# Patient Record
Sex: MALE | Employment: OTHER | URBAN - METROPOLITAN AREA
[De-identification: names, ages, dates, MRNs, and addresses within clinical notes are randomized per-mention and may not be internally consistent; named-entity substitution may affect disease eponyms.]

---

## 2024-01-29 SDOH — ECONOMIC STABILITY: HOUSING INSECURITY
IN THE LAST 12 MONTHS, WAS THERE A TIME WHEN YOU DID NOT HAVE A STEADY PLACE TO SLEEP OR SLEPT IN A SHELTER (INCLUDING NOW)?: NO

## 2024-01-29 SDOH — ECONOMIC STABILITY: FOOD INSECURITY: WITHIN THE PAST 12 MONTHS, THE FOOD YOU BOUGHT JUST DIDN'T LAST AND YOU DIDN'T HAVE MONEY TO GET MORE.: NEVER TRUE

## 2024-01-29 SDOH — ECONOMIC STABILITY: INCOME INSECURITY: IN THE LAST 12 MONTHS, WAS THERE A TIME WHEN YOU WERE NOT ABLE TO PAY THE MORTGAGE OR RENT ON TIME?: NO

## 2024-01-29 SDOH — ECONOMIC STABILITY: TRANSPORTATION INSECURITY
IN THE PAST 12 MONTHS, HAS THE LACK OF TRANSPORTATION KEPT YOU FROM MEDICAL APPOINTMENTS OR FROM GETTING MEDICATIONS?: NO

## 2024-01-29 SDOH — HEALTH STABILITY: PHYSICAL HEALTH: ON AVERAGE, HOW MANY MINUTES DO YOU ENGAGE IN EXERCISE AT THIS LEVEL?: 60 MIN

## 2024-01-29 SDOH — ECONOMIC STABILITY: FOOD INSECURITY: WITHIN THE PAST 12 MONTHS, YOU WORRIED THAT YOUR FOOD WOULD RUN OUT BEFORE YOU GOT MONEY TO BUY MORE.: NEVER TRUE

## 2024-01-29 SDOH — ECONOMIC STABILITY: TRANSPORTATION INSECURITY
IN THE PAST 12 MONTHS, HAS LACK OF TRANSPORTATION KEPT YOU FROM MEETINGS, WORK, OR FROM GETTING THINGS NEEDED FOR DAILY LIVING?: NO

## 2024-01-29 SDOH — HEALTH STABILITY: MENTAL HEALTH
STRESS IS WHEN SOMEONE FEELS TENSE, NERVOUS, ANXIOUS, OR CAN'T SLEEP AT NIGHT BECAUSE THEIR MIND IS TROUBLED. HOW STRESSED ARE YOU?: NOT AT ALL

## 2024-01-29 SDOH — HEALTH STABILITY: PHYSICAL HEALTH: ON AVERAGE, HOW MANY DAYS PER WEEK DO YOU ENGAGE IN MODERATE TO STRENUOUS EXERCISE (LIKE A BRISK WALK)?: 7 DAYS

## 2024-01-29 SDOH — ECONOMIC STABILITY: TRANSPORTATION INSECURITY
IN THE PAST 12 MONTHS, HAS LACK OF RELIABLE TRANSPORTATION KEPT YOU FROM MEDICAL APPOINTMENTS, MEETINGS, WORK OR FROM GETTING THINGS NEEDED FOR DAILY LIVING?: NO

## 2024-01-29 SDOH — ECONOMIC STABILITY: HOUSING INSECURITY

## 2024-01-29 ASSESSMENT — LIFESTYLE VARIABLES
AUDIT-C TOTAL SCORE: 0
HOW OFTEN DO YOU HAVE A DRINK CONTAINING ALCOHOL: NEVER
SKIP TO QUESTIONS 9-10: 1
HOW MANY STANDARD DRINKS CONTAINING ALCOHOL DO YOU HAVE ON A TYPICAL DAY: PATIENT DOES NOT DRINK
HOW OFTEN DO YOU HAVE SIX OR MORE DRINKS ON ONE OCCASION: NEVER

## 2024-01-29 ASSESSMENT — SOCIAL DETERMINANTS OF HEALTH (SDOH)
HOW OFTEN DO YOU ATTEND CHURCH OR RELIGIOUS SERVICES?: NEVER
HOW OFTEN DO YOU HAVE A DRINK CONTAINING ALCOHOL: NEVER
HOW OFTEN DO YOU HAVE SIX OR MORE DRINKS ON ONE OCCASION: NEVER
HOW OFTEN DO YOU ATTENT MEETINGS OF THE CLUB OR ORGANIZATION YOU BELONG TO?: NEVER
IN A TYPICAL WEEK, HOW MANY TIMES DO YOU TALK ON THE PHONE WITH FAMILY, FRIENDS, OR NEIGHBORS?: MORE THAN THREE TIMES A WEEK
HOW OFTEN DO YOU GET TOGETHER WITH FRIENDS OR RELATIVES?: PATIENT DECLINED
DO YOU BELONG TO ANY CLUBS OR ORGANIZATIONS SUCH AS CHURCH GROUPS UNIONS, FRATERNAL OR ATHLETIC GROUPS, OR SCHOOL GROUPS?: NO
HOW OFTEN DO YOU GET TOGETHER WITH FRIENDS OR RELATIVES?: PATIENT DECLINED
WITHIN THE PAST 12 MONTHS, YOU WORRIED THAT YOUR FOOD WOULD RUN OUT BEFORE YOU GOT THE MONEY TO BUY MORE: NEVER TRUE
DO YOU BELONG TO ANY CLUBS OR ORGANIZATIONS SUCH AS CHURCH GROUPS UNIONS, FRATERNAL OR ATHLETIC GROUPS, OR SCHOOL GROUPS?: NO
IN A TYPICAL WEEK, HOW MANY TIMES DO YOU TALK ON THE PHONE WITH FAMILY, FRIENDS, OR NEIGHBORS?: MORE THAN THREE TIMES A WEEK
HOW MANY DRINKS CONTAINING ALCOHOL DO YOU HAVE ON A TYPICAL DAY WHEN YOU ARE DRINKING: PATIENT DOES NOT DRINK
HOW OFTEN DO YOU ATTEND CHURCH OR RELIGIOUS SERVICES?: NEVER
HOW OFTEN DO YOU ATTENT MEETINGS OF THE CLUB OR ORGANIZATION YOU BELONG TO?: NEVER

## 2024-02-01 ENCOUNTER — OFFICE VISIT (OUTPATIENT)
Dept: MEDICAL GROUP | Facility: LAB | Age: 79
End: 2024-02-01
Payer: MEDICARE

## 2024-02-01 VITALS
HEIGHT: 70 IN | HEART RATE: 92 BPM | BODY MASS INDEX: 25.05 KG/M2 | TEMPERATURE: 98 F | WEIGHT: 175 LBS | OXYGEN SATURATION: 96 % | RESPIRATION RATE: 12 BRPM | SYSTOLIC BLOOD PRESSURE: 122 MMHG | DIASTOLIC BLOOD PRESSURE: 64 MMHG

## 2024-02-01 DIAGNOSIS — I15.2 HYPERTENSION ASSOCIATED WITH TYPE 2 DIABETES MELLITUS (HCC): ICD-10-CM

## 2024-02-01 DIAGNOSIS — E11.59 HYPERTENSION ASSOCIATED WITH TYPE 2 DIABETES MELLITUS (HCC): ICD-10-CM

## 2024-02-01 DIAGNOSIS — N18.31 STAGE 3A CHRONIC KIDNEY DISEASE: ICD-10-CM

## 2024-02-01 DIAGNOSIS — G62.9 NEUROPATHY: ICD-10-CM

## 2024-02-01 DIAGNOSIS — R17 ELEVATED BILIRUBIN: ICD-10-CM

## 2024-02-01 DIAGNOSIS — E11.69 TYPE 2 DIABETES MELLITUS WITH OTHER SPECIFIED COMPLICATION, WITHOUT LONG-TERM CURRENT USE OF INSULIN (HCC): Chronic | ICD-10-CM

## 2024-02-01 PROBLEM — I10 BENIGN ESSENTIAL HTN: Chronic | Status: ACTIVE | Noted: 2021-12-16

## 2024-02-01 PROBLEM — I77.810 ASCENDING AORTA DILATATION (HCC): Status: ACTIVE | Noted: 2023-02-20

## 2024-02-01 PROBLEM — R13.10 DYSPHAGIA: Status: ACTIVE | Noted: 2021-12-16

## 2024-02-01 PROBLEM — N18.2 CHRONIC KIDNEY DISEASE, STAGE 2, MILDLY DECREASED GFR: Status: ACTIVE | Noted: 2023-09-13

## 2024-02-01 PROBLEM — E11.40 DIABETIC NEUROPATHY (HCC): Chronic | Status: ACTIVE | Noted: 2021-12-16

## 2024-02-01 PROBLEM — R39.12 WEAK URINARY STREAM: Status: ACTIVE | Noted: 2023-06-15

## 2024-02-01 PROBLEM — I35.8 MILD AORTIC VALVE SCLEROSIS: Status: ACTIVE | Noted: 2023-02-20

## 2024-02-01 PROBLEM — K75.81 NASH (NONALCOHOLIC STEATOHEPATITIS): Chronic | Status: ACTIVE | Noted: 2021-12-16

## 2024-02-01 PROBLEM — E03.9 ACQUIRED HYPOTHYROIDISM: Chronic | Status: ACTIVE | Noted: 2021-12-16

## 2024-02-01 PROBLEM — M25.512 LEFT SHOULDER PAIN: Status: ACTIVE | Noted: 2023-07-11

## 2024-02-01 LAB
HBA1C MFR BLD: 5.7 % (ref ?–5.8)
POCT INT CON NEG: NEGATIVE
POCT INT CON POS: POSITIVE

## 2024-02-01 PROCEDURE — 3078F DIAST BP <80 MM HG: CPT | Performed by: STUDENT IN AN ORGANIZED HEALTH CARE EDUCATION/TRAINING PROGRAM

## 2024-02-01 PROCEDURE — 3074F SYST BP LT 130 MM HG: CPT | Performed by: STUDENT IN AN ORGANIZED HEALTH CARE EDUCATION/TRAINING PROGRAM

## 2024-02-01 PROCEDURE — 83036 HEMOGLOBIN GLYCOSYLATED A1C: CPT | Performed by: STUDENT IN AN ORGANIZED HEALTH CARE EDUCATION/TRAINING PROGRAM

## 2024-02-01 PROCEDURE — 99204 OFFICE O/P NEW MOD 45 MIN: CPT | Performed by: STUDENT IN AN ORGANIZED HEALTH CARE EDUCATION/TRAINING PROGRAM

## 2024-02-01 RX ORDER — SITAGLIPTIN AND METFORMIN HYDROCHLORIDE 1000; 50 MG/1; MG/1
1 TABLET, FILM COATED, EXTENDED RELEASE ORAL 2 TIMES DAILY
COMMUNITY
Start: 2024-01-10

## 2024-02-01 RX ORDER — LISINOPRIL AND HYDROCHLOROTHIAZIDE 20; 12.5 MG/1; MG/1
1 TABLET ORAL DAILY
COMMUNITY
End: 2024-02-01

## 2024-02-01 RX ORDER — LEVOTHYROXINE SODIUM 112 UG/1
112 TABLET ORAL
COMMUNITY
End: 2024-03-20 | Stop reason: SDUPTHER

## 2024-02-01 RX ORDER — LISINOPRIL 20 MG/1
20 TABLET ORAL DAILY
COMMUNITY

## 2024-02-01 RX ORDER — GABAPENTIN 600 MG/1
600 TABLET ORAL NIGHTLY
COMMUNITY
Start: 2023-10-30 | End: 2024-02-01

## 2024-02-01 RX ORDER — FINASTERIDE 5 MG/1
1 TABLET, FILM COATED ORAL DAILY
COMMUNITY
Start: 2023-08-30 | End: 2024-02-01

## 2024-02-01 RX ORDER — SITAGLIPTIN AND METFORMIN HYDROCHLORIDE 1000; 50 MG/1; MG/1
1 TABLET, FILM COATED ORAL 2 TIMES DAILY WITH MEALS
COMMUNITY
End: 2024-02-01

## 2024-02-01 ASSESSMENT — FIBROSIS 4 INDEX: FIB4 SCORE: 1.44

## 2024-02-01 ASSESSMENT — PATIENT HEALTH QUESTIONNAIRE - PHQ9: CLINICAL INTERPRETATION OF PHQ2 SCORE: 0

## 2024-02-01 NOTE — PATIENT INSTRUCTIONS
Ideal blood pressure <130/80 and at least <140/90.  If <100 for the top number we are over treating.    Ideal glucose ranges: fasting , random (1-2 hours after eating) <180. If glucose <70, we need to adjust your medication to prevent continuing low blood sugar.    Aim for goal of 150 minutes a week of moderate intensity exercise (ex 30 minutes 5 times a week)    Topicals for neuropathy:  Capsicin (careful, don't touch eyes), lidocaine topical    Oral options:  Cymbalta, Lyrica

## 2024-02-01 NOTE — PROGRESS NOTES
Subjective:     Chief Complaint   Patient presents with    Establish Care     HISTORY OF THE PRESENT ILLNESS: Patient is a 78 y.o. male. This pleasant patient is here today to establish care and discuss neuropathy. His/her prior PCP was Dr. Betsy Swan.    We reviewed patient's history.  States that in the past 18 months he has had worsening neuropathy symptoms in his bilateral feet.  In the past he was trialed and titrated up on gabapentin max 600 mg at night but found it not helpful and sedating.  Denies any back pain or radiating symptoms.  Symptoms are typically worse at night with numbness and tingling causing an uncomfortable sensation in his feet.  States that at times he feels like his balance is off but has not had any falls.  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  States that his heels have cracked at times and he does use lotion for this with improvement.    He does have a history of diabetes which she reports was better controlled when living in [place] and more active.  As he recalls his maximum A1c was 6.6%.    He previously was on finasteride 5 mg daily but found it ineffective as well after taking it for some time so has discontinued for a few months.  For his diabetes he has been consistent with Janumet XR  mg 1 tablet twice daily without side effects.    Review of Systems   Constitutional:  Negative for chills, fever, malaise/fatigue and weight loss.   Respiratory:  Negative for cough and shortness of breath.    Cardiovascular:  Negative for chest pain, palpitations, claudication and leg swelling.   Gastrointestinal:  Negative for abdominal pain, diarrhea, nausea and vomiting.   Musculoskeletal:  Negative for back pain and falls.   Skin:  Negative for rash.   Neurological:  Positive for tingling and sensory change.         Objective:     Exam: /64 (BP Location: Left arm, Patient Position: Sitting, BP Cuff Size: Adult)   Pulse 92   Temp 36.7  "°C (98 °F)   Resp 12   Ht 1.778 m (5' 10\")   Wt 79.4 kg (175 lb)   SpO2 96%  Body mass index is 25.11 kg/m².    Physical Exam  Vitals reviewed.   Constitutional:       General: He is not in acute distress.     Appearance: Normal appearance. He is not ill-appearing.   HENT:      Head: Normocephalic and atraumatic.      Nose: Nose normal.      Mouth/Throat:      Mouth: Mucous membranes are moist.   Cardiovascular:      Rate and Rhythm: Normal rate and regular rhythm.      Heart sounds: Normal heart sounds. No murmur heard.  Pulmonary:      Effort: Pulmonary effort is normal. No respiratory distress.      Breath sounds: Normal breath sounds. No wheezing, rhonchi or rales.   Musculoskeletal:      Cervical back: Normal range of motion and neck supple.      Right lower leg: No edema.      Left lower leg: No edema.   Feet:      Right foot:      Skin integrity: Dry skin present. No warmth or callus.      Left foot:      Skin integrity: Dry skin present. No erythema, warmth or callus.      Comments: Monofilament testing with a 10 gram force: sensation intact: intact bilaterally, minimal decreased to monofilament in heels bilaterally.   Visual Inspection: Feet without maceration, ulcers, fissures. Dry heels.  Pedal pulses: intact bilaterally    Skin:     General: Skin is warm and dry.   Neurological:      General: No focal deficit present.      Mental Status: He is alert and oriented to person, place, and time.      Sensory: No sensory deficit (Intact to light tough and vibration in LE.).      Gait: Gait normal.      Deep Tendon Reflexes: Babinski sign absent on the right side. Babinski sign absent on the left side.      Reflex Scores:       Patellar reflexes are 2+ on the right side and 2+ on the left side.       Achilles reflexes are 2+ on the right side and 2+ on the left side.  Psychiatric:         Mood and Affect: Mood normal.         Behavior: Behavior normal.         Thought Content: Thought content normal. "       Labs: Reviewed from 9/2022, 6/9/2023, 9/8/2023  Imaging: Reviewed from 2/10/2023    Assessment & Plan:   78 y.o. male with the following -    1. Neuropathy  This is a chronic condition, worsening in the past 18 months.  Suspected to be related to diabetes however patient has never particularly had uncontrolled diabetes with highest A1c 6.6%.  Evaluate further with labs in nerve conduction testing as below.  Patient declines trial of alternative medications for symptoms at this time we discussed some topical medication such as lidocaine or capsaicin which may be helpful.  - VITAMIN B12; Future  - TSH WITH REFLEX TO FT4; Future  - ANTI-NUCLEAR ANTIBODY SERUM; Future  - VITAMIN B1; Future  - SERUM PROTEIN ELECTROPHORESIS; Future  - IMMUNOFIXATION ELECTROPHORESIS (TORRES), SERUM; Future  - Sed Rate; Future  - PROTEIN ELECTROPHORESIS, UR; Future  - Referral to Neurodiagnostics (EEG,EP,EMG/NCS/DBS)    2. Type 2 diabetes mellitus with other specified complication, without long-term current use of insulin (HCC)  Chronic, controlled. Continue medication(s) as below.  Encouraged diabetic diet and exercise as tolerated.  Plan for retinopathy exam and follow-up.  - A1C in 6 months.  - POCT  A1C    3. Stage 3a chronic kidney disease (HCC)  Chronic condition, stable. BP well controlled, continue ACEI. Avoid nephrotoxic medications and renally dose metformin as indicated. Trend Cr.  - Comp Metabolic Panel; Future  - CBC WITH DIFFERENTIAL; Future  - MICROALBUMIN CREAT RATIO URINE; Future  - PTH INTACT (PTH ONLY); Future  - PHOSPHORUS; Future    4. Hypertension associated with type 2 diabetes mellitus (HCC)  Chronic, controlled. Continue below medication(s). Discussed ideal ranges/return precautions.    lisinopril (PRINIVIL) 20 MG Tab, Take 20 mg by mouth every day., Disp: , Rfl:     5. Elevated bilirubin  Chronic, mild elevation.  He does have a history of fatty liver disease.  Trend.  - Comp Metabolic Panel; Future  - BILIRUBIN  DIRECT; Future    Return in 2 months (on 4/1/2024), or if symptoms worsen or fail to improve.    Please note that this dictation was created using voice recognition software. I have made every reasonable attempt to correct obvious errors, but I expect that there are errors of grammar and possibly content that I did not discover before finalizing the note.

## 2024-02-02 ASSESSMENT — ENCOUNTER SYMPTOMS
SHORTNESS OF BREATH: 0
DIARRHEA: 0
SENSORY CHANGE: 1
NAUSEA: 0
COUGH: 0
FEVER: 0
WEIGHT LOSS: 0
ABDOMINAL PAIN: 0
PALPITATIONS: 0
FALLS: 0
BACK PAIN: 0
CLAUDICATION: 0
VOMITING: 0
TINGLING: 1
CHILLS: 0

## 2024-02-14 ENCOUNTER — HOSPITAL ENCOUNTER (OUTPATIENT)
Dept: LAB | Facility: MEDICAL CENTER | Age: 79
End: 2024-02-14
Attending: STUDENT IN AN ORGANIZED HEALTH CARE EDUCATION/TRAINING PROGRAM
Payer: MEDICARE

## 2024-02-14 DIAGNOSIS — R17 ELEVATED BILIRUBIN: ICD-10-CM

## 2024-02-14 DIAGNOSIS — G62.9 NEUROPATHY: ICD-10-CM

## 2024-02-14 DIAGNOSIS — N18.31 STAGE 3A CHRONIC KIDNEY DISEASE: ICD-10-CM

## 2024-02-14 LAB
BASOPHILS # BLD AUTO: 0.5 % (ref 0–1.8)
BASOPHILS # BLD: 0.02 K/UL (ref 0–0.12)
EOSINOPHIL # BLD AUTO: 0.04 K/UL (ref 0–0.51)
EOSINOPHIL NFR BLD: 1 % (ref 0–6.9)
ERYTHROCYTE [DISTWIDTH] IN BLOOD BY AUTOMATED COUNT: 42 FL (ref 35.9–50)
HCT VFR BLD AUTO: 43.1 % (ref 42–52)
HGB BLD-MCNC: 14.4 G/DL (ref 14–18)
IMM GRANULOCYTES # BLD AUTO: 0.01 K/UL (ref 0–0.11)
IMM GRANULOCYTES NFR BLD AUTO: 0.2 % (ref 0–0.9)
LYMPHOCYTES # BLD AUTO: 1.39 K/UL (ref 1–4.8)
LYMPHOCYTES NFR BLD: 34.4 % (ref 22–41)
MCH RBC QN AUTO: 29.8 PG (ref 27–33)
MCHC RBC AUTO-ENTMCNC: 33.4 G/DL (ref 32.3–36.5)
MCV RBC AUTO: 89 FL (ref 81.4–97.8)
MONOCYTES # BLD AUTO: 0.33 K/UL (ref 0–0.85)
MONOCYTES NFR BLD AUTO: 8.2 % (ref 0–13.4)
NEUTROPHILS # BLD AUTO: 2.25 K/UL (ref 1.82–7.42)
NEUTROPHILS NFR BLD: 55.7 % (ref 44–72)
NRBC # BLD AUTO: 0 K/UL
NRBC BLD-RTO: 0 /100 WBC (ref 0–0.2)
PLATELET # BLD AUTO: 127 K/UL (ref 164–446)
PMV BLD AUTO: 10.5 FL (ref 9–12.9)
PTH-INTACT SERPL-MCNC: 30.1 PG/ML (ref 14–72)
RBC # BLD AUTO: 4.84 M/UL (ref 4.7–6.1)
VIT B12 SERPL-MCNC: 1056 PG/ML (ref 211–911)
WBC # BLD AUTO: 4 K/UL (ref 4.8–10.8)

## 2024-02-14 PROCEDURE — 85025 COMPLETE CBC W/AUTO DIFF WBC: CPT

## 2024-02-14 PROCEDURE — 84155 ASSAY OF PROTEIN SERUM: CPT | Mod: XU

## 2024-02-14 PROCEDURE — 82570 ASSAY OF URINE CREATININE: CPT

## 2024-02-14 PROCEDURE — 82248 BILIRUBIN DIRECT: CPT

## 2024-02-14 PROCEDURE — 80053 COMPREHEN METABOLIC PANEL: CPT

## 2024-02-14 PROCEDURE — 84425 ASSAY OF VITAMIN B-1: CPT

## 2024-02-14 PROCEDURE — 82607 VITAMIN B-12: CPT

## 2024-02-14 PROCEDURE — 36415 COLL VENOUS BLD VENIPUNCTURE: CPT

## 2024-02-14 PROCEDURE — 86038 ANTINUCLEAR ANTIBODIES: CPT

## 2024-02-14 PROCEDURE — 85652 RBC SED RATE AUTOMATED: CPT

## 2024-02-14 PROCEDURE — 84165 PROTEIN E-PHORESIS SERUM: CPT

## 2024-02-14 PROCEDURE — 86334 IMMUNOFIX E-PHORESIS SERUM: CPT

## 2024-02-14 PROCEDURE — 83970 ASSAY OF PARATHORMONE: CPT

## 2024-02-14 PROCEDURE — 84100 ASSAY OF PHOSPHORUS: CPT

## 2024-02-14 PROCEDURE — 82043 UR ALBUMIN QUANTITATIVE: CPT

## 2024-02-15 LAB
ALBUMIN SERPL BCP-MCNC: 4.5 G/DL (ref 3.2–4.9)
ALBUMIN/GLOB SERPL: 1.3 G/DL
ALP SERPL-CCNC: 44 U/L (ref 30–99)
ALT SERPL-CCNC: 27 U/L (ref 2–50)
ANION GAP SERPL CALC-SCNC: 13 MMOL/L (ref 7–16)
AST SERPL-CCNC: 23 U/L (ref 12–45)
BILIRUB CONJ SERPL-MCNC: 0.2 MG/DL (ref 0.1–0.5)
BILIRUB INDIRECT SERPL-MCNC: 0.9 MG/DL (ref 0–1)
BILIRUB SERPL-MCNC: 1.1 MG/DL (ref 0.1–1.5)
BUN SERPL-MCNC: 16 MG/DL (ref 8–22)
CALCIUM ALBUM COR SERPL-MCNC: 8.9 MG/DL (ref 8.5–10.5)
CALCIUM SERPL-MCNC: 9.3 MG/DL (ref 8.5–10.5)
CHLORIDE SERPL-SCNC: 100 MMOL/L (ref 96–112)
CO2 SERPL-SCNC: 23 MMOL/L (ref 20–33)
CREAT SERPL-MCNC: 0.97 MG/DL (ref 0.5–1.4)
CREAT UR-MCNC: 229.34 MG/DL
ERYTHROCYTE [SEDIMENTATION RATE] IN BLOOD BY WESTERGREN METHOD: 7 MM/HOUR (ref 0–20)
GFR SERPLBLD CREATININE-BSD FMLA CKD-EPI: 81 ML/MIN/1.73 M 2
GLOBULIN SER CALC-MCNC: 3.4 G/DL (ref 1.9–3.5)
GLUCOSE SERPL-MCNC: 121 MG/DL (ref 65–99)
MICROALBUMIN UR-MCNC: <1.2 MG/DL
MICROALBUMIN/CREAT UR: NORMAL MG/G (ref 0–30)
PHOSPHATE SERPL-MCNC: 1.8 MG/DL (ref 2.5–4.5)
POTASSIUM SERPL-SCNC: 4 MMOL/L (ref 3.6–5.5)
PROT SERPL-MCNC: 7.9 G/DL (ref 6–8.2)
SODIUM SERPL-SCNC: 136 MMOL/L (ref 135–145)

## 2024-02-16 LAB — NUCLEAR IGG SER QL IA: NORMAL

## 2024-02-17 LAB
ALBUMIN SERPL ELPH-MCNC: 4.24 G/DL (ref 3.75–5.01)
ALPHA1 GLOB SERPL ELPH-MCNC: 0.25 G/DL (ref 0.19–0.46)
ALPHA2 GLOB SERPL ELPH-MCNC: 0.77 G/DL (ref 0.48–1.05)
B-GLOBULIN SERPL ELPH-MCNC: 0.9 G/DL (ref 0.48–1.1)
EER PROT ELECT SER Q1092: NORMAL
GAMMA GLOB SERPL ELPH-MCNC: 1.35 G/DL (ref 0.62–1.51)
INTERPRETATION SERPL IFE-IMP: NORMAL
INTERPRETATION SERPL IFE-IMP: NORMAL
MONOCLONAL PROTEIN NL11656: NORMAL G/DL
PATHOLOGY STUDY: NORMAL
PROT SERPL-MCNC: 7.5 G/DL (ref 6.3–8.2)

## 2024-02-18 LAB — VIT B1 BLD-MCNC: 84 NMOL/L (ref 70–180)

## 2024-03-04 DIAGNOSIS — E03.9 ACQUIRED HYPOTHYROIDISM: Chronic | ICD-10-CM

## 2024-03-04 RX ORDER — LEVOTHYROXINE SODIUM 112 UG/1
112 TABLET ORAL
Qty: 90 TABLET | Refills: 3 | Status: CANCELLED | OUTPATIENT
Start: 2024-03-04

## 2024-03-20 ENCOUNTER — HOSPITAL ENCOUNTER (OUTPATIENT)
Dept: LAB | Facility: MEDICAL CENTER | Age: 79
End: 2024-03-20
Attending: STUDENT IN AN ORGANIZED HEALTH CARE EDUCATION/TRAINING PROGRAM
Payer: MEDICARE

## 2024-03-20 DIAGNOSIS — E03.9 ACQUIRED HYPOTHYROIDISM: Chronic | ICD-10-CM

## 2024-03-20 LAB
T4 FREE SERPL-MCNC: 1.5 NG/DL (ref 0.93–1.7)
TSH SERPL DL<=0.005 MIU/L-ACNC: 0.17 UIU/ML (ref 0.38–5.33)

## 2024-03-20 PROCEDURE — 36415 COLL VENOUS BLD VENIPUNCTURE: CPT

## 2024-03-20 PROCEDURE — 84443 ASSAY THYROID STIM HORMONE: CPT

## 2024-03-20 PROCEDURE — 84439 ASSAY OF FREE THYROXINE: CPT

## 2024-03-20 RX ORDER — LEVOTHYROXINE SODIUM 112 UG/1
112 TABLET ORAL
Qty: 90 TABLET | Refills: 0 | Status: SHIPPED | OUTPATIENT
Start: 2024-03-20

## 2024-04-04 ENCOUNTER — OFFICE VISIT (OUTPATIENT)
Dept: MEDICAL GROUP | Facility: LAB | Age: 79
End: 2024-04-04
Payer: MEDICARE

## 2024-04-04 ENCOUNTER — HOSPITAL ENCOUNTER (OUTPATIENT)
Dept: LAB | Facility: MEDICAL CENTER | Age: 79
End: 2024-04-04
Attending: STUDENT IN AN ORGANIZED HEALTH CARE EDUCATION/TRAINING PROGRAM
Payer: MEDICARE

## 2024-04-04 VITALS
DIASTOLIC BLOOD PRESSURE: 58 MMHG | RESPIRATION RATE: 16 BRPM | WEIGHT: 179 LBS | HEART RATE: 90 BPM | SYSTOLIC BLOOD PRESSURE: 116 MMHG | OXYGEN SATURATION: 95 % | HEIGHT: 70 IN | TEMPERATURE: 97.1 F | BODY MASS INDEX: 25.62 KG/M2

## 2024-04-04 DIAGNOSIS — E03.9 ACQUIRED HYPOTHYROIDISM: Chronic | ICD-10-CM

## 2024-04-04 DIAGNOSIS — E83.39 HYPOPHOSPHATEMIA: ICD-10-CM

## 2024-04-04 DIAGNOSIS — E11.69 TYPE 2 DIABETES MELLITUS WITH OTHER SPECIFIED COMPLICATION, WITHOUT LONG-TERM CURRENT USE OF INSULIN (HCC): Chronic | ICD-10-CM

## 2024-04-04 DIAGNOSIS — E11.42 DIABETIC POLYNEUROPATHY ASSOCIATED WITH TYPE 2 DIABETES MELLITUS (HCC): Chronic | ICD-10-CM

## 2024-04-04 DIAGNOSIS — D69.6 THROMBOCYTOPENIA (HCC): ICD-10-CM

## 2024-04-04 DIAGNOSIS — D72.819 LEUKOPENIA, UNSPECIFIED TYPE: ICD-10-CM

## 2024-04-04 DIAGNOSIS — N18.2 STAGE 2 CHRONIC KIDNEY DISEASE: ICD-10-CM

## 2024-04-04 PROBLEM — N18.31 STAGE 3A CHRONIC KIDNEY DISEASE: Status: RESOLVED | Noted: 2023-09-13 | Resolved: 2024-04-04

## 2024-04-04 LAB
BASOPHILS # BLD AUTO: 0.7 % (ref 0–1.8)
BASOPHILS # BLD: 0.03 K/UL (ref 0–0.12)
EOSINOPHIL # BLD AUTO: 0.12 K/UL (ref 0–0.51)
EOSINOPHIL NFR BLD: 3 % (ref 0–6.9)
ERYTHROCYTE [DISTWIDTH] IN BLOOD BY AUTOMATED COUNT: 42.8 FL (ref 35.9–50)
HCT VFR BLD AUTO: 46.6 % (ref 42–52)
HGB BLD-MCNC: 15.8 G/DL (ref 14–18)
IMM GRANULOCYTES # BLD AUTO: 0.01 K/UL (ref 0–0.11)
IMM GRANULOCYTES NFR BLD AUTO: 0.2 % (ref 0–0.9)
LYMPHOCYTES # BLD AUTO: 1.48 K/UL (ref 1–4.8)
LYMPHOCYTES NFR BLD: 36.6 % (ref 22–41)
MCH RBC QN AUTO: 30.2 PG (ref 27–33)
MCHC RBC AUTO-ENTMCNC: 33.9 G/DL (ref 32.3–36.5)
MCV RBC AUTO: 88.9 FL (ref 81.4–97.8)
MONOCYTES # BLD AUTO: 0.41 K/UL (ref 0–0.85)
MONOCYTES NFR BLD AUTO: 10.1 % (ref 0–13.4)
NEUTROPHILS # BLD AUTO: 1.99 K/UL (ref 1.82–7.42)
NEUTROPHILS NFR BLD: 49.4 % (ref 44–72)
NRBC # BLD AUTO: 0 K/UL
NRBC BLD-RTO: 0 /100 WBC (ref 0–0.2)
PLATELET # BLD AUTO: 155 K/UL (ref 164–446)
PLATELET # BLD AUTO: 191 K/UL (ref 164–446)
PLATELETS.RETICULATED NFR BLD AUTO: 10.3 % (ref 0.6–13.1)
PMV BLD AUTO: 10.4 FL (ref 9–12.9)
RBC # BLD AUTO: 5.24 M/UL (ref 4.7–6.1)
WBC # BLD AUTO: 4 K/UL (ref 4.8–10.8)

## 2024-04-04 PROCEDURE — 36415 COLL VENOUS BLD VENIPUNCTURE: CPT

## 2024-04-04 PROCEDURE — 3074F SYST BP LT 130 MM HG: CPT | Performed by: STUDENT IN AN ORGANIZED HEALTH CARE EDUCATION/TRAINING PROGRAM

## 2024-04-04 PROCEDURE — 84100 ASSAY OF PHOSPHORUS: CPT

## 2024-04-04 PROCEDURE — 82746 ASSAY OF FOLIC ACID SERUM: CPT

## 2024-04-04 PROCEDURE — 84443 ASSAY THYROID STIM HORMONE: CPT

## 2024-04-04 PROCEDURE — 99214 OFFICE O/P EST MOD 30 MIN: CPT | Performed by: STUDENT IN AN ORGANIZED HEALTH CARE EDUCATION/TRAINING PROGRAM

## 2024-04-04 PROCEDURE — 85055 RETICULATED PLATELET ASSAY: CPT

## 2024-04-04 PROCEDURE — 3078F DIAST BP <80 MM HG: CPT | Performed by: STUDENT IN AN ORGANIZED HEALTH CARE EDUCATION/TRAINING PROGRAM

## 2024-04-04 PROCEDURE — 85025 COMPLETE CBC W/AUTO DIFF WBC: CPT

## 2024-04-04 PROCEDURE — 84439 ASSAY OF FREE THYROXINE: CPT

## 2024-04-04 ASSESSMENT — ENCOUNTER SYMPTOMS
VOMITING: 0
COUGH: 0
BRUISES/BLEEDS EASILY: 0
DIARRHEA: 0
FEVER: 0
NAUSEA: 0
BLOOD IN STOOL: 0
SHORTNESS OF BREATH: 0
ABDOMINAL PAIN: 0
WEIGHT LOSS: 0
CHILLS: 0

## 2024-04-04 ASSESSMENT — FIBROSIS 4 INDEX: FIB4 SCORE: 2.65

## 2024-04-04 NOTE — PATIENT INSTRUCTIONS
non-fasting labs due ASAP    If thyroid off still, we will reduce the dose and repeat labs in 4-6 weeks.     Ideal glucose ranges: fasting , random (1-2 hours after eating) <180. If glucose <70, we need to adjust your medication to prevent continuing low blood sugar.    Ideal blood pressure <130/80 and at least <140/90.  If <100 for the top number we are over treating.  Severe range >180/120, with symptoms=ER

## 2024-04-04 NOTE — PROGRESS NOTES
"Subjective:     Chief Complaint   Patient presents with    Follow-Up     HPI:   Shay presents today for follow-up and lab review.    See last visit for details.  Patient states that neuropathy symptoms have improved and he finds over-the-counter CBD orally to be helpful.    Review of Systems   Constitutional:  Negative for chills, fever, malaise/fatigue and weight loss.   HENT:  Negative for nosebleeds.    Respiratory:  Negative for cough and shortness of breath.    Cardiovascular:  Negative for chest pain and palpitations.   Gastrointestinal:  Negative for abdominal pain, blood in stool, diarrhea, nausea and vomiting.   Genitourinary:  Negative for hematuria.   Skin:  Negative for rash.   Neurological:  Positive for tingling and sensory change.   Endo/Heme/Allergies:  Does not bruise/bleed easily.     Objective:     Exam:  /58 (BP Location: Left arm, Patient Position: Sitting, BP Cuff Size: Adult)   Pulse 90   Temp 36.2 °C (97.1 °F) (Temporal)   Resp 16   Ht 1.778 m (5' 10\")   Wt 81.2 kg (179 lb)   SpO2 95%   BMI 25.68 kg/m²  Body mass index is 25.68 kg/m².    Physical Exam  Vitals reviewed.   Constitutional:       General: He is not in acute distress.     Appearance: Normal appearance. He is not ill-appearing.   HENT:      Head: Normocephalic and atraumatic.      Nose: Nose normal.      Mouth/Throat:      Mouth: Mucous membranes are moist.   Cardiovascular:      Rate and Rhythm: Normal rate and regular rhythm.      Heart sounds: Normal heart sounds. No murmur heard.  Pulmonary:      Effort: Pulmonary effort is normal. No respiratory distress.      Breath sounds: Normal breath sounds. No wheezing, rhonchi or rales.   Musculoskeletal:      Cervical back: Normal range of motion and neck supple.      Right lower leg: No edema.      Left lower leg: No edema.   Skin:     General: Skin is warm and dry.   Neurological:      General: No focal deficit present.      Mental Status: He is alert and oriented to " person, place, and time.   Psychiatric:         Mood and Affect: Mood normal.         Behavior: Behavior normal.         Thought Content: Thought content normal.     Treatment  Labs: Reviewed from 2/14/24    Assessment & Plan:     78 y.o. male with the following -     1. Diabetic polyneuropathy associated with type 2 diabetes mellitus (HCC)  Chronic, controlled with OTC CBD. Labs reviewed with patient. Trend A1C in 6m.     2. Type 2 diabetes mellitus with other specified complication, without long-term current use of insulin (HCC)  Chronic, controlled. Continue medication(s) as below.   - A1C in 6 months.   - Annual fasting labs ordered to complete in 9/2024  - Comp Metabolic Panel; Future  - Lipid Profile; Future  - HEMOGLOBIN A1C; Future    JANUMET XR  MG TABLET SR 24 HR, Take 1 Tablet by mouth 2 times a day., Disp: , Rfl:     3. Stage 2 chronic kidney disease  4. Hypophosphatemia  Chronic kidney disease, improved. Phosphate low, PTH normal. DM as above, BP well controlled, continue ACEI. Avoid nephrotoxic medications. Trend Cr in 6m, repeat phosphorus non fasting today.  - PHOSPHORUS; Future    5. Acquired hypothyroidism  Chronic, TSH suppressed. Repeat to confirm today, dicussed plan if still over treated.   - TSH WITH REFLEX TO FT4; Future    6. Thrombocytopenia (HCC)  7. Leukopenia, unspecified type  New, mild. Trend and check folate for completeness. ADRI and B12 normal. Obtain on citrate tube to exclude platelet clumping.   - CBC WITH DIFFERENTIAL; Future  - FOLATE; Future  ___________________  Labs obtained after visit reviewed.    4. Hypophosphatemia  Improved on repeat but persists with non fasting labs. PTH normal, see thyroid below. Trend with next labs, check vitamin D.   - PHOSPHORUS; Future  - PHOSPHORUS; Future  - VITAMIN D,25 HYDROXY (DEFICIENCY); Future  - Basic Metabolic Panel; Future    5. Acquired hypothyroidism  Chronic, TSH still suppressed. Reduce levothyroxine as previously discussed  in clinic to 100 mcg daily and trend labs in 4 to 6 weeks.  - TSH WITH REFLEX TO FT4; Future  - levothyroxine (SYNTHROID) 100 MCG Tab; Take 1 Tablet by mouth every morning on an empty stomach. Repeat labs in 4-6 weeks  Dispense: 90 Tablet; Refill: 0  - TSH; Future  - FREE THYROXINE; Future  - TRIIDOTHYRONINE; Future    7. Leukopenia, unspecified type  Chronic, stable. B12/folate/ADRI normal. Hep C negative.2019, no risk factors for HIV and patient is prior  so has been checked prior. Trend CBC with labs in 6m.   - CBC WITH DIFFERENTIAL; Future  - FOLATE; Future  - CBC WITH DIFFERENTIAL; Future    Return in about 6 months (around 10/4/2024), or if symptoms worsen or fail to improve, for Diabetes, Annual Medicare Visit.    Please note that this dictation was created using voice recognition software. I have made every reasonable attempt to correct obvious errors, but I expect that there are errors of grammar and possibly content that I did not discover before finalizing the note.

## 2024-04-05 LAB
FOLATE SERPL-MCNC: 29.2 NG/ML
PHOSPHATE SERPL-MCNC: 2.1 MG/DL (ref 2.5–4.5)
T4 FREE SERPL-MCNC: 1.65 NG/DL (ref 0.93–1.7)
TSH SERPL DL<=0.005 MIU/L-ACNC: 0.23 UIU/ML (ref 0.38–5.33)

## 2024-04-07 PROBLEM — R17 ELEVATED BILIRUBIN: Status: RESOLVED | Noted: 2024-02-01 | Resolved: 2024-04-07

## 2024-04-07 RX ORDER — LEVOTHYROXINE SODIUM 0.1 MG/1
100 TABLET ORAL
Qty: 90 TABLET | Refills: 0 | Status: SHIPPED | OUTPATIENT
Start: 2024-04-07 | End: 2024-04-20

## 2024-04-07 ASSESSMENT — ENCOUNTER SYMPTOMS
PALPITATIONS: 0
TINGLING: 1
SENSORY CHANGE: 1

## 2024-04-20 ENCOUNTER — OFFICE VISIT (OUTPATIENT)
Dept: URGENT CARE | Facility: PHYSICIAN GROUP | Age: 79
End: 2024-04-20
Payer: MEDICARE

## 2024-04-20 VITALS
WEIGHT: 171 LBS | DIASTOLIC BLOOD PRESSURE: 44 MMHG | HEART RATE: 94 BPM | OXYGEN SATURATION: 94 % | TEMPERATURE: 97.3 F | SYSTOLIC BLOOD PRESSURE: 115 MMHG | BODY MASS INDEX: 24.48 KG/M2 | HEIGHT: 70 IN | RESPIRATION RATE: 12 BRPM

## 2024-04-20 DIAGNOSIS — J01.90 ACUTE NON-RECURRENT SINUSITIS, UNSPECIFIED LOCATION: ICD-10-CM

## 2024-04-20 PROCEDURE — 3078F DIAST BP <80 MM HG: CPT

## 2024-04-20 PROCEDURE — 3074F SYST BP LT 130 MM HG: CPT

## 2024-04-20 PROCEDURE — 99213 OFFICE O/P EST LOW 20 MIN: CPT

## 2024-04-20 RX ORDER — AMOXICILLIN AND CLAVULANATE POTASSIUM 875; 125 MG/1; MG/1
1 TABLET, FILM COATED ORAL 2 TIMES DAILY
Qty: 10 TABLET | Refills: 0 | Status: SHIPPED | OUTPATIENT
Start: 2024-04-20 | End: 2024-04-25

## 2024-04-20 RX ORDER — DEXTROMETHORPHAN HYDROBROMIDE AND PROMETHAZINE HYDROCHLORIDE 15; 6.25 MG/5ML; MG/5ML
5 SYRUP ORAL
Qty: 118 ML | Refills: 0 | Status: SHIPPED | OUTPATIENT
Start: 2024-04-20

## 2024-04-20 ASSESSMENT — ENCOUNTER SYMPTOMS
WHEEZING: 0
MYALGIAS: 0
ABDOMINAL PAIN: 0
SINUS PAIN: 0
FEVER: 0
NAUSEA: 0
SPUTUM PRODUCTION: 1
COUGH: 1
SORE THROAT: 1
HEADACHES: 0
DIARRHEA: 0
CHILLS: 0
SHORTNESS OF BREATH: 0
VOMITING: 0

## 2024-04-20 ASSESSMENT — FIBROSIS 4 INDEX: FIB4 SCORE: 2.17

## 2024-04-20 ASSESSMENT — COPD QUESTIONNAIRES: COPD: 0

## 2024-04-20 NOTE — PROGRESS NOTES
"Subjective:   Shay Ventura is a 78 y.o. male who presents for Sore Throat (Cough, stomach px x4 days)      Cough  This is a new problem. Episode onset: x4 days. The problem has been rapidly worsening. The cough is Non-productive (Occasionally productive or in the a.m.). Associated symptoms include nasal congestion, postnasal drip and a sore throat. Pertinent negatives include no chest pain, chills, ear congestion, ear pain, fever, headaches, myalgias, rash, shortness of breath or wheezing. The symptoms are aggravated by lying down (Unable to sleep throughout the night). Treatments tried: Chloraseptic. The treatment provided mild relief. There is no history of asthma, bronchitis, COPD, environmental allergies or pneumonia.       Review of Systems   Constitutional:  Negative for chills, fever and malaise/fatigue.   HENT:  Positive for congestion, postnasal drip and sore throat. Negative for ear pain, hearing loss and sinus pain.    Respiratory:  Positive for cough (Persistent/hacking/causes abdomen to feel sore after coughing) and sputum production. Negative for shortness of breath and wheezing.    Cardiovascular:  Negative for chest pain.   Gastrointestinal:  Negative for abdominal pain, diarrhea, nausea and vomiting.   Genitourinary:  Negative for dysuria.   Musculoskeletal:  Negative for myalgias.   Skin:  Negative for rash.   Neurological:  Negative for headaches.   Endo/Heme/Allergies:  Negative for environmental allergies.       Medications, Allergies, and current problem list reviewed today in Epic.     Objective:     /44 (BP Location: Right arm, Patient Position: Sitting, BP Cuff Size: Adult)   Pulse 94   Temp 36.3 °C (97.3 °F) (Temporal)   Resp 12   Ht 1.778 m (5' 10\")   Wt 77.6 kg (171 lb)   SpO2 94%     Physical Exam  Vitals and nursing note reviewed.   Constitutional:       General: He is not in acute distress.     Appearance: Normal appearance. He is not ill-appearing.   HENT:      Head: " Normocephalic and atraumatic.      Right Ear: Tympanic membrane, ear canal and external ear normal.      Left Ear: Tympanic membrane, ear canal and external ear normal.      Nose: Congestion and rhinorrhea present.      Right Turbinates: Enlarged.      Left Turbinates: Enlarged.      Mouth/Throat:      Mouth: Mucous membranes are moist.      Pharynx: Oropharynx is clear. Posterior oropharyngeal erythema present. No oropharyngeal exudate.   Eyes:      Conjunctiva/sclera: Conjunctivae normal.      Pupils: Pupils are equal, round, and reactive to light.   Cardiovascular:      Rate and Rhythm: Normal rate.      Heart sounds: Normal heart sounds.   Pulmonary:      Effort: Pulmonary effort is normal.      Breath sounds: Normal breath sounds.   Abdominal:      General: Abdomen is flat.      Palpations: Abdomen is soft.   Musculoskeletal:      Cervical back: No rigidity or tenderness.   Lymphadenopathy:      Cervical: No cervical adenopathy.   Skin:     General: Skin is warm and dry.      Capillary Refill: Capillary refill takes less than 2 seconds.   Neurological:      Mental Status: He is alert and oriented to person, place, and time.   Psychiatric:         Mood and Affect: Mood normal.         Behavior: Behavior normal.         Assessment/Plan:       1. Acute non-recurrent sinusitis, unspecified location  promethazine-dextromethorphan (PROMETHAZINE-DM) 6.25-15 MG/5ML syrup    amoxicillin-clavulanate (AUGMENTIN) 875-125 MG Tab        After ROS and physical exam it does appear the patient may have the start of a sinus infection due to increased in severity and symptoms.  Patient will be prescribed Augmentin.  Patient told to take as prescribed.  Patient reports that he does not have any real significant medical history and discussed with patient the use of Phenergan cough syrup.  Patient was told to take this only at night with no other sedative medications.  Patient told to monitor for any abnormal signs and symptoms  associated with this medication and if any concerns to stop medication immediately.  Patient was instructed to start daily nondrowsy antihistamine, sinus rinses, and Flonase as needed for symptom management.  Recommend adequate hydration, rest, deep breathing and coughing, ambulation as tolerated, OTC medications.  Patient to monitor for any worsening signs and symptoms if any other concerns to return to the urgent care or emergency department for further management.    Differential diagnosis, natural history, and supportive care discussed. We also reviewed side effects of medication including allergic response, GI upset, tendon injury, rash, sedation etc. Patient and/or guardian voices understanding.      Advised the patient to follow-up with the primary care physician for recheck, reevaluation, and consideration of further management.    I personally reviewed prior external notes and test results pertinent to today's visit as well as additional imaging and testing completed in clinic today.     Please note that this dictation was created using voice recognition software. I have made every reasonable attempt to correct obvious errors, but I expect that there are errors of grammar and possibly content that I did not discover before finalizing the note.    This note was electronically signed by MALIK Summers

## 2024-07-28 ENCOUNTER — PATIENT MESSAGE (OUTPATIENT)
Dept: MEDICAL GROUP | Facility: LAB | Age: 79
End: 2024-07-28
Payer: MEDICARE

## 2024-07-28 DIAGNOSIS — E11.69 TYPE 2 DIABETES MELLITUS WITH OTHER SPECIFIED COMPLICATION, WITHOUT LONG-TERM CURRENT USE OF INSULIN (HCC): Chronic | ICD-10-CM

## 2024-07-30 DIAGNOSIS — E03.9 ACQUIRED HYPOTHYROIDISM: Chronic | ICD-10-CM

## 2024-07-30 RX ORDER — SITAGLIPTIN AND METFORMIN HYDROCHLORIDE 1000; 100 MG/1; MG/1
TABLET, FILM COATED, EXTENDED RELEASE ORAL
COMMUNITY
End: 2024-07-30 | Stop reason: SDUPTHER

## 2024-07-30 RX ORDER — SITAGLIPTIN AND METFORMIN HYDROCHLORIDE 1000; 100 MG/1; MG/1
1 TABLET, FILM COATED, EXTENDED RELEASE ORAL 2 TIMES DAILY
Qty: 180 TABLET | Refills: 3 | Status: SHIPPED | OUTPATIENT
Start: 2024-07-30

## 2024-08-01 RX ORDER — LEVOTHYROXINE SODIUM 112 UG/1
112 TABLET ORAL
Qty: 90 TABLET | Refills: 0 | OUTPATIENT
Start: 2024-08-01

## 2024-08-01 RX ORDER — LISINOPRIL AND HYDROCHLOROTHIAZIDE 12.5; 2 MG/1; MG/1
1 TABLET ORAL DAILY
Qty: 30 TABLET | OUTPATIENT
Start: 2024-08-01

## 2024-08-27 ENCOUNTER — APPOINTMENT (OUTPATIENT)
Dept: NEUROLOGY | Facility: MEDICAL CENTER | Age: 79
End: 2024-08-27
Attending: SPECIALIST
Payer: MEDICARE

## 2024-09-06 ENCOUNTER — HOSPITAL ENCOUNTER (OUTPATIENT)
Dept: LAB | Facility: MEDICAL CENTER | Age: 79
End: 2024-09-06
Attending: STUDENT IN AN ORGANIZED HEALTH CARE EDUCATION/TRAINING PROGRAM
Payer: MEDICARE

## 2024-09-06 DIAGNOSIS — D72.819 LEUKOPENIA, UNSPECIFIED TYPE: ICD-10-CM

## 2024-09-06 DIAGNOSIS — E11.69 TYPE 2 DIABETES MELLITUS WITH OTHER SPECIFIED COMPLICATION, WITHOUT LONG-TERM CURRENT USE OF INSULIN (HCC): Chronic | ICD-10-CM

## 2024-09-06 LAB
ALBUMIN SERPL BCP-MCNC: 4.5 G/DL (ref 3.2–4.9)
ALBUMIN/GLOB SERPL: 1.4 G/DL
ALP SERPL-CCNC: 49 U/L (ref 30–99)
ALT SERPL-CCNC: 39 U/L (ref 2–50)
ANION GAP SERPL CALC-SCNC: 12 MMOL/L (ref 7–16)
AST SERPL-CCNC: 27 U/L (ref 12–45)
BASOPHILS # BLD AUTO: 0.5 % (ref 0–1.8)
BASOPHILS # BLD: 0.02 K/UL (ref 0–0.12)
BILIRUB SERPL-MCNC: 1.2 MG/DL (ref 0.1–1.5)
BUN SERPL-MCNC: 16 MG/DL (ref 8–22)
CALCIUM ALBUM COR SERPL-MCNC: 9.2 MG/DL (ref 8.5–10.5)
CALCIUM SERPL-MCNC: 9.6 MG/DL (ref 8.5–10.5)
CHLORIDE SERPL-SCNC: 100 MMOL/L (ref 96–112)
CHOLEST SERPL-MCNC: 110 MG/DL (ref 100–199)
CO2 SERPL-SCNC: 24 MMOL/L (ref 20–33)
CREAT SERPL-MCNC: 1.1 MG/DL (ref 0.5–1.4)
EOSINOPHIL # BLD AUTO: 0.11 K/UL (ref 0–0.51)
EOSINOPHIL NFR BLD: 2.5 % (ref 0–6.9)
ERYTHROCYTE [DISTWIDTH] IN BLOOD BY AUTOMATED COUNT: 44 FL (ref 35.9–50)
EST. AVERAGE GLUCOSE BLD GHB EST-MCNC: 128 MG/DL
FASTING STATUS PATIENT QL REPORTED: NORMAL
GFR SERPLBLD CREATININE-BSD FMLA CKD-EPI: 69 ML/MIN/1.73 M 2
GLOBULIN SER CALC-MCNC: 3.3 G/DL (ref 1.9–3.5)
GLUCOSE SERPL-MCNC: 95 MG/DL (ref 65–99)
HBA1C MFR BLD: 6.1 % (ref 4–5.6)
HCT VFR BLD AUTO: 45.9 % (ref 42–52)
HDLC SERPL-MCNC: 41 MG/DL
HGB BLD-MCNC: 15.4 G/DL (ref 14–18)
IMM GRANULOCYTES # BLD AUTO: 0.01 K/UL (ref 0–0.11)
IMM GRANULOCYTES NFR BLD AUTO: 0.2 % (ref 0–0.9)
LDLC SERPL CALC-MCNC: 61 MG/DL
LYMPHOCYTES # BLD AUTO: 1.53 K/UL (ref 1–4.8)
LYMPHOCYTES NFR BLD: 35.4 % (ref 22–41)
MCH RBC QN AUTO: 30.1 PG (ref 27–33)
MCHC RBC AUTO-ENTMCNC: 33.6 G/DL (ref 32.3–36.5)
MCV RBC AUTO: 89.8 FL (ref 81.4–97.8)
MONOCYTES # BLD AUTO: 0.44 K/UL (ref 0–0.85)
MONOCYTES NFR BLD AUTO: 10.2 % (ref 0–13.4)
NEUTROPHILS # BLD AUTO: 2.21 K/UL (ref 1.82–7.42)
NEUTROPHILS NFR BLD: 51.2 % (ref 44–72)
NRBC # BLD AUTO: 0 K/UL
NRBC BLD-RTO: 0 /100 WBC (ref 0–0.2)
PLATELET # BLD AUTO: 170 K/UL (ref 164–446)
PLATELET # BLD AUTO: 186 K/UL (ref 164–446)
PMV BLD AUTO: 9.4 FL (ref 9–12.9)
POTASSIUM SERPL-SCNC: 4.7 MMOL/L (ref 3.6–5.5)
PROT SERPL-MCNC: 7.8 G/DL (ref 6–8.2)
RBC # BLD AUTO: 5.11 M/UL (ref 4.7–6.1)
SODIUM SERPL-SCNC: 136 MMOL/L (ref 135–145)
TRIGL SERPL-MCNC: 42 MG/DL (ref 0–149)
WBC # BLD AUTO: 4.3 K/UL (ref 4.8–10.8)

## 2024-09-06 PROCEDURE — 36415 COLL VENOUS BLD VENIPUNCTURE: CPT

## 2024-09-06 PROCEDURE — 83036 HEMOGLOBIN GLYCOSYLATED A1C: CPT | Mod: GA

## 2024-09-06 PROCEDURE — 80061 LIPID PANEL: CPT

## 2024-09-06 PROCEDURE — 85025 COMPLETE CBC W/AUTO DIFF WBC: CPT

## 2024-09-06 PROCEDURE — 80053 COMPREHEN METABOLIC PANEL: CPT

## 2024-10-16 ENCOUNTER — NON-PROVIDER VISIT (OUTPATIENT)
Dept: NEUROLOGY | Facility: MEDICAL CENTER | Age: 79
End: 2024-10-16
Attending: STUDENT IN AN ORGANIZED HEALTH CARE EDUCATION/TRAINING PROGRAM
Payer: MEDICARE

## 2024-10-16 DIAGNOSIS — G62.9 NEUROPATHY: ICD-10-CM

## 2024-10-16 DIAGNOSIS — R20.0 BILATERAL LEG NUMBNESS: ICD-10-CM

## 2024-10-16 PROCEDURE — 95885 MUSC TST DONE W/NERV TST LIM: CPT | Mod: 26 | Performed by: STUDENT IN AN ORGANIZED HEALTH CARE EDUCATION/TRAINING PROGRAM

## 2024-10-16 PROCEDURE — 95909 NRV CNDJ TST 5-6 STUDIES: CPT | Performed by: STUDENT IN AN ORGANIZED HEALTH CARE EDUCATION/TRAINING PROGRAM

## 2024-10-16 PROCEDURE — 95886 MUSC TEST DONE W/N TEST COMP: CPT | Performed by: STUDENT IN AN ORGANIZED HEALTH CARE EDUCATION/TRAINING PROGRAM

## 2024-10-16 PROCEDURE — 95885 MUSC TST DONE W/NERV TST LIM: CPT | Performed by: STUDENT IN AN ORGANIZED HEALTH CARE EDUCATION/TRAINING PROGRAM

## 2024-10-16 PROCEDURE — 95909 NRV CNDJ TST 5-6 STUDIES: CPT | Mod: 26 | Performed by: STUDENT IN AN ORGANIZED HEALTH CARE EDUCATION/TRAINING PROGRAM

## 2024-10-16 PROCEDURE — 95886 MUSC TEST DONE W/N TEST COMP: CPT | Mod: 26,59 | Performed by: STUDENT IN AN ORGANIZED HEALTH CARE EDUCATION/TRAINING PROGRAM

## 2024-12-13 ENCOUNTER — HOSPITAL ENCOUNTER (OUTPATIENT)
Dept: LAB | Facility: MEDICAL CENTER | Age: 79
End: 2024-12-13
Attending: STUDENT IN AN ORGANIZED HEALTH CARE EDUCATION/TRAINING PROGRAM
Payer: MEDICARE

## 2024-12-13 DIAGNOSIS — E83.39 HYPOPHOSPHATEMIA: ICD-10-CM

## 2024-12-13 DIAGNOSIS — E03.9 ACQUIRED HYPOTHYROIDISM: Chronic | ICD-10-CM

## 2024-12-13 PROCEDURE — 84439 ASSAY OF FREE THYROXINE: CPT

## 2024-12-13 PROCEDURE — 80048 BASIC METABOLIC PNL TOTAL CA: CPT

## 2024-12-13 PROCEDURE — 84100 ASSAY OF PHOSPHORUS: CPT

## 2024-12-13 PROCEDURE — 36415 COLL VENOUS BLD VENIPUNCTURE: CPT

## 2024-12-13 PROCEDURE — 82306 VITAMIN D 25 HYDROXY: CPT

## 2024-12-13 PROCEDURE — 84443 ASSAY THYROID STIM HORMONE: CPT

## 2024-12-13 PROCEDURE — 84480 ASSAY TRIIODOTHYRONINE (T3): CPT

## 2024-12-14 LAB
25(OH)D3 SERPL-MCNC: 36 NG/ML (ref 30–100)
ANION GAP SERPL CALC-SCNC: 12 MMOL/L (ref 7–16)
BUN SERPL-MCNC: 10 MG/DL (ref 8–22)
CALCIUM SERPL-MCNC: 9.5 MG/DL (ref 8.5–10.5)
CHLORIDE SERPL-SCNC: 102 MMOL/L (ref 96–112)
CO2 SERPL-SCNC: 24 MMOL/L (ref 20–33)
CREAT SERPL-MCNC: 1.06 MG/DL (ref 0.5–1.4)
GFR SERPLBLD CREATININE-BSD FMLA CKD-EPI: 72 ML/MIN/1.73 M 2
GLUCOSE SERPL-MCNC: 96 MG/DL (ref 65–99)
PHOSPHATE SERPL-MCNC: 1.8 MG/DL (ref 2.5–4.5)
POTASSIUM SERPL-SCNC: 4.2 MMOL/L (ref 3.6–5.5)
SODIUM SERPL-SCNC: 138 MMOL/L (ref 135–145)
T3 SERPL-MCNC: 117 NG/DL (ref 60–181)
T4 FREE SERPL-MCNC: 1.02 NG/DL (ref 0.93–1.7)
TSH SERPL-ACNC: 3.32 UIU/ML (ref 0.35–5.5)

## 2024-12-17 ENCOUNTER — APPOINTMENT (OUTPATIENT)
Dept: MEDICAL GROUP | Facility: LAB | Age: 79
End: 2024-12-17
Payer: COMMERCIAL

## 2024-12-17 VITALS
DIASTOLIC BLOOD PRESSURE: 62 MMHG | OXYGEN SATURATION: 100 % | RESPIRATION RATE: 20 BRPM | HEIGHT: 70 IN | SYSTOLIC BLOOD PRESSURE: 118 MMHG | TEMPERATURE: 97 F | WEIGHT: 183 LBS | BODY MASS INDEX: 26.2 KG/M2 | HEART RATE: 68 BPM

## 2024-12-17 DIAGNOSIS — R20.2 TINGLING OF BOTH FEET: ICD-10-CM

## 2024-12-17 DIAGNOSIS — Z00.00 MEDICARE ANNUAL WELLNESS VISIT, SUBSEQUENT: Primary | ICD-10-CM

## 2024-12-17 DIAGNOSIS — D72.819 LEUKOPENIA, UNSPECIFIED TYPE: ICD-10-CM

## 2024-12-17 DIAGNOSIS — I15.2 HYPERTENSION ASSOCIATED WITH TYPE 2 DIABETES MELLITUS (HCC): ICD-10-CM

## 2024-12-17 DIAGNOSIS — E03.9 ACQUIRED HYPOTHYROIDISM: Chronic | ICD-10-CM

## 2024-12-17 DIAGNOSIS — E11.59 HYPERTENSION ASSOCIATED WITH TYPE 2 DIABETES MELLITUS (HCC): ICD-10-CM

## 2024-12-17 DIAGNOSIS — E83.39 HYPOPHOSPHATEMIA: ICD-10-CM

## 2024-12-17 PROBLEM — G62.9 NEUROPATHY: Status: RESOLVED | Noted: 2024-01-08 | Resolved: 2024-12-17

## 2024-12-17 PROBLEM — H25.9 AGE-RELATED CATARACT OF BOTH EYES: Status: ACTIVE | Noted: 2024-12-17

## 2024-12-17 PROCEDURE — 3078F DIAST BP <80 MM HG: CPT | Performed by: STUDENT IN AN ORGANIZED HEALTH CARE EDUCATION/TRAINING PROGRAM

## 2024-12-17 PROCEDURE — 3074F SYST BP LT 130 MM HG: CPT | Performed by: STUDENT IN AN ORGANIZED HEALTH CARE EDUCATION/TRAINING PROGRAM

## 2024-12-17 PROCEDURE — G0439 PPPS, SUBSEQ VISIT: HCPCS | Performed by: STUDENT IN AN ORGANIZED HEALTH CARE EDUCATION/TRAINING PROGRAM

## 2024-12-17 RX ORDER — LISINOPRIL 20 MG/1
20 TABLET ORAL DAILY
Qty: 90 TABLET | Refills: 3 | Status: SHIPPED | OUTPATIENT
Start: 2024-12-17 | End: 2024-12-17

## 2024-12-17 RX ORDER — LISINOPRIL 20 MG/1
20 TABLET ORAL DAILY
COMMUNITY
End: 2024-12-17 | Stop reason: SDUPTHER

## 2024-12-17 RX ORDER — LISINOPRIL 20 MG/1
20 TABLET ORAL DAILY
Qty: 90 TABLET | Refills: 3 | Status: SHIPPED | OUTPATIENT
Start: 2024-12-17

## 2024-12-17 RX ORDER — LEVOTHYROXINE SODIUM 112 UG/1
112 TABLET ORAL DAILY
COMMUNITY
Start: 2024-07-01 | End: 2024-12-28

## 2024-12-17 ASSESSMENT — FIBROSIS 4 INDEX: FIB4 SCORE: 1.96

## 2024-12-17 ASSESSMENT — ACTIVITIES OF DAILY LIVING (ADL): BATHING_REQUIRES_ASSISTANCE: 0

## 2024-12-17 ASSESSMENT — ENCOUNTER SYMPTOMS: GENERAL WELL-BEING: EXCELLENT

## 2024-12-17 ASSESSMENT — PATIENT HEALTH QUESTIONNAIRE - PHQ9: CLINICAL INTERPRETATION OF PHQ2 SCORE: 0

## 2024-12-17 NOTE — PROGRESS NOTES
Chief Complaint   Patient presents with    Medicare Annual Wellness     HPI:  Shay Ventura is a 79 y.o. here for Medicare Annual Wellness Visit     Verbal consent was acquired by the patient to use [employer] ambient listening note generation during this visit Yes     History of Present Illness  The patient is a 79-year-old male who presents for his annual wellness visit and lab review.    He reports no current health concerns.  He recently underwent an eye examination with Dr. Familia Escobar at the [facility], which revealed bilateral cataracts but no other ocular issues. He has not experienced any falls and reports no potential tripping hazards in his home. He reports no cough, gastrointestinal issues, urinary symptoms, or leg swelling. He does not consume alcohol or use tobacco.    He has been taking lisinopril inconsistently, only a few times per week, and did take it today. He is requesting a refill of his lisinopril prescription.    He experiences tingling in his toes bilaterally, predominantly at night, but reports no associated pain. He does not experience any back pain currently but has sought chiropractic care in the past for massage therapy. He reports no history of serious back injuries. He ensures his footwear is comfortable.    He continues to take thyroid medication at a dose of 112 mcg daily.      IMMUNIZATIONS  He declined the influenza vaccine.      Patient Active Problem List    Diagnosis Date Noted    Age-related cataract of both eyes 12/17/2024    Tingling of both feet 12/17/2024    Leukopenia 04/04/2024    Hypophosphatemia 04/04/2024    Stage 2 chronic kidney disease 04/04/2024    Left shoulder pain 07/11/2023    Ascending aorta dilatation (HCC) 02/20/2023    Mild aortic valve sclerosis 02/20/2023    Acquired hypothyroidism 12/16/2021    Hypertension associated with type 2 diabetes mellitus (HCC) 12/16/2021    Dysphagia 12/16/2021    WOODY (nonalcoholic steatohepatitis) 12/16/2021    BPH  (benign prostatic hyperplasia) 05/21/2014    Polyp of gallbladder 12/06/2013    Benign paroxysmal positional vertigo 08/01/2011    Hypothyroidism following radioiodine therapy 07/23/2010    Microscopic hematuria 07/07/2010    Type 2 diabetes mellitus (HCC) 07/07/2010    GERD (gastroesophageal reflux disease) 10/20/2009       Current Outpatient Medications   Medication Sig Dispense Refill    levothyroxine (SYNTHROID) 112 MCG Tab Take 112 mcg by mouth every day.      lisinopril (PRINIVIL) 20 MG Tab Take 1 Tablet by mouth every day. 90 Tablet 3    SitaGLIPtin-MetFORMIN HCl (JANUMET XR) 100-1000 MG TABLET SR 24 HR Take 1 Tablet by mouth 2 times a day. 180 Tablet 3     No current facility-administered medications for this visit.          Current supplements as per medication list.     Allergies: Patient has no known allergies.    Current social contact/activities: Spends time with family. 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     He  reports that he has never smoked. He has never used smokeless tobacco. He reports that he does not currently use alcohol. He reports that he does not currently use drugs.  Counseling given: Not Answered    ROS:    Gait: Uses no assistive device  Ostomy: No  Other tubes: No  Amputations: No  Chronic oxygen use: No  Last eye exam:Nov 2024-has cataracts   Wears hearing aids: No   : Denies any urinary leakage during the last 6 months    Screening:    Depression Screening  Little interest or pleasure in doing things?  0 - not at all  Feeling down, depressed , or hopeless? 0 - not at all  Trouble falling or staying asleep, or sleeping too much?     Feeling tired or having little energy?     Poor appetite or overeating?     Feeling bad about yourself - or that you are a failure or have let yourself or your family down?    Trouble concentrating on things, such as reading the newspaper or watching television?    Moving or speaking so slowly that other  people could have noticed.  Or the opposite - being so fidgety or restless that you have been moving around a lot more than usual?     Thoughts that you would be better off dead, or of hurting yourself?     Patient Health Questionnaire Score:      If depressive symptoms identified deferred to follow up visit unless specifically addressed in assessment and plan.    Interpretation of PHQ-9 Total Score   Score Severity   1-4 No Depression   5-9 Mild Depression   10-14 Moderate Depression   15-19 Moderately Severe Depression   20-27 Severe Depression    Screening for Cognitive Impairment  Do you or any of your friends or family members have any concern about your memory? No  Three Minute Recall (Leader, Season, Table) 2/3     Vaughn clock face with all 12 numbers and set the hands to show 10 minutes after 11.  Yes    Cognitive concerns identified deferred for follow up unless specifically addressed in assessment and plan.    Fall Risk Assessment  Has the patient had two or more falls in the last year or any fall with injury in the last year?  No    Safety Assessment  Do you always wear your seatbelt?  Yes   Any changes to home needed to function safely? No  Difficulty hearing.  No  Patient counseled about all safety risks that were identified.    Functional Assessment ADLs  Are there any barriers preventing you from cooking for yourself or meeting nutritional needs?  No.    Are there any barriers preventing you from driving safely or obtaining transportation?  No.    Are there any barriers preventing you from using a telephone or calling for help?  No    Are there any barriers preventing you from shopping?  No.    Are there any barriers preventing you from taking care of your own finances?  No    Are there any barriers preventing you from managing your medications?  No    Are there any barriers preventing you from showering, bathing or dressing yourself? No    Are there any barriers preventing you from doing housework or  laundry? No    Are there any barriers preventing you from using the toilet?No    Are you currently engaging in any exercise or physical activity?  Yes.      Self-Assessment of Health  What is your perception of your health? Excellent    Do you sleep more than six hours a night? Yes    In the past 7 days, how much did pain keep you from doing your normal work? None    Do you spend quality time with family or friends (virtually or in person)? Yes    Do you usually eat a heart healthy diet that constists of a variety of fruits, vegetables, whole grains and fiber? Yes    Do you eat foods high in fat and/or Fast Food more than three times per week? No    How concerned are you that your medical conditions are not being well managed? Not at all    Are you worried that in the next 2 months, you may not have stable housing that you own, rent, or stay in as part of a household? No      Advance Care Planning  Do you have an Advance Directive, Living Will, Durable Power of , or POLST? Yes  Advance Directive Living Will Durable Power of    is not on file - instructed patient to bring in a copy to scan into their chart    Health Maintenance Summary            Postponed - Diabetes: Retinopathy Screening (Yearly) Postponed until 4/4/2025      11/21/2016      10/05/2015      02/26/2015      06/16/2014      06/27/2013      Only the first 5 history entries have been loaded, but more history exists.              Postponed - Zoster (Shingles) Vaccines (2 of 3) Postponed until 5/17/2025 05/18/2016  Imm Admin: Zoster Vaccine Live (ZVL) (Zostavax) - HISTORICAL DATA              Postponed - IMM DTaP/Tdap/Td Vaccine (2 - Td or Tdap) Postponed until 12/13/2025 01/18/2011  Imm Admin: Tdap Vaccine              Postponed - Influenza Vaccine (1) Postponed until 12/17/2025      10/05/2015  Imm Admin: Influenza high-dose trivalent (PF)    10/17/2013  Imm Admin: Influenza split virus trivalent (PF)    10/25/2012  Imm  Admin: Influenza Vaccine Pediatric Split - Historical Data              Postponed - COVID-19 Vaccine (5 - 2024-25 season) Postponed until 12/17/2025      03/10/2021  Imm Admin: PFIZER BIVALENT SARS-COV-2 VACCINE (12+)    03/10/2021  Imm Admin: PFIZER PURPLE CAP SARS-COV-2 VACCINATION (12+)    02/17/2021  Imm Admin: PFIZER PURPLE CAP SARS-COV-2 VACCINATION (12+)    02/17/2021  Imm Admin: PFIZER BIVALENT SARS-COV-2 VACCINE (12+)              Diabetes: Monofilament / LE Exam (Yearly) Next due on 2/1/2025 02/01/2024  Smart[employer]: WORKFLOW - DIABETES - DIABETIC FOOT EXAM PERFORMED              Diabetes: Urine Protein Screening (Yearly) Next due on 2/14/2025 02/14/2024  MICROALBUMIN CREAT RATIO URINE    09/08/2023  MICROALBUMIN CREAT RATIO URINE    05/17/2022  MICROALBUM. UR RANDOM              A1c Screening (Every 6 Months) Next due on 3/6/2025      09/06/2024  HEMOGLOBIN A1C    02/01/2024  POCT  A1C    09/08/2023  HEMOGLOBIN A1C    06/09/2023  HEMOGLOBIN A1C    09/27/2022  HEMOGLOBIN A1C    Only the first 5 history entries have been loaded, but more history exists.              Fasting Lipid Profile (Yearly) Next due on 9/6/2025 09/06/2024  Lipid Profile    09/08/2023  Outside Procedure: LIPID PROFILE              SERUM CREATININE (Yearly) Next due on 12/13/2025 12/13/2024  Basic Metabolic Panel    09/06/2024  Comp Metabolic Panel    02/14/2024  Comp Metabolic Panel    09/08/2023  COMP METABOLIC PANEL    06/09/2023  COMP METABOLIC PANEL    Only the first 5 history entries have been loaded, but more history exists.              Annual Wellness Visit (Yearly) Next due on 12/17/2025 12/17/2024  Level of Service: [place] ANNUAL WELLNESS VISIT-INCLUDES PPPS SUBSEQUE*    12/17/2024  Visit Dx: Medicare annual wellness visit, subsequent    05/18/2016 01/31/2014 11/20/2012      Only the first 5 history entries have been loaded, but more history exists.              Pneumococcal Vaccine: 65+ Years  "(Series Information) Completed      05/18/2016  Imm Admin: Pneumococcal Conjugate Vaccine (Prevnar/PCV-13)    11/20/2012  Imm Admin: Pneumococcal polysaccharide vaccine (PPSV-23)              Hepatitis C Screening  Completed      07/10/2019  Done              Hepatitis A Vaccine (Hep A) (Series Information) Aged Out      No completion history exists for this topic.              Hepatitis B Vaccine (Hep B) (Series Information) Aged Out      No completion history exists for this topic.              HPV Vaccines (Series Information) Aged Out      No completion history exists for this topic.              Polio Vaccine (Inactivated Polio) (Series Information) Aged Out      No completion history exists for this topic.              Meningococcal Immunization (Series Information) Aged Out      No completion history exists for this topic.                    Patient Care Team:  Radha Martin D.O. as PCP - General (Family Medicine)  Dr. Familia Escobar at the [facility]    Social History     Tobacco Use    Smoking status: Never    Smokeless tobacco: Never   Vaping Use    Vaping status: Never Used   Substance Use Topics    Alcohol use: Not Currently    Drug use: Not Currently     Family History   Problem Relation Age of Onset    Thyroid Mother         hypo    Diabetes Father     Hypertension Father     Diabetes Paternal Aunt     Diabetes Paternal Uncle     Diabetes Paternal Grandmother      He  has a past medical history of Diabetes (HCC), Elevated bilirubin, High alanine aminotransferase (ALT) level, Hypertension, Neuropathy (01/08/2024), and Stage 3a chronic kidney disease.   Past Surgical History:   Procedure Laterality Date    EMG  10/16/2024       Exam:   /62 (BP Location: Right arm, Patient Position: Sitting, BP Cuff Size: Adult)   Pulse 68   Temp 36.1 °C (97 °F) (Temporal)   Resp 20   Ht 1.778 m (5' 10\")   Wt 83 kg (183 lb)   SpO2 100%  Body mass index is 26.26 kg/m².    Hearing good.    Dentition " good.  Physical Exam  Vitals reviewed.   Constitutional:       General: He is not in acute distress.     Appearance: Normal appearance. He is not ill-appearing.   HENT:      Head: Normocephalic and atraumatic.      Nose: Nose normal.      Mouth/Throat:      Mouth: Mucous membranes are moist.   Cardiovascular:      Rate and Rhythm: Normal rate and regular rhythm.      Heart sounds: Normal heart sounds. No murmur heard.  Pulmonary:      Effort: Pulmonary effort is normal. No respiratory distress.      Breath sounds: Normal breath sounds. No wheezing, rhonchi or rales.   Musculoskeletal:      Cervical back: Normal range of motion and neck supple.      Right lower leg: No edema.      Left lower leg: No edema.   Skin:     General: Skin is warm and dry.   Neurological:      General: No focal deficit present.      Mental Status: He is alert and oriented to person, place, and time.   Psychiatric:         Mood and Affect: Mood normal.         Behavior: Behavior normal.         Thought Content: Thought content normal.         Labs: Reviewed from 9/6/2024, 12/13/2024    Assessment and Plan. The following treatment and monitoring plan is recommended:      1. Medicare annual wellness visit, subsequent  Services suggested: No services needed at this time  Health Care Screening: Age-appropriate preventive services recommended by USPTF and ACIP covered by Medicare were discussed today. Services ordered if indicated and agreed upon by the patient.  Immunizations reviewed/recommended and directed to the pharmacy if not available in clinic.  Referrals offered: Community-based lifestyle interventions to reduce health risks and promote self-management and wellness, fall prevention, nutrition, physical activity, tobacco-use cessation, weight loss, and mental health services as per orders if indicated.    Discussion today about general wellness and lifestyle habits:    Prevent falls and reduce trip hazards; Cautioned about securing or  removing rugs.  Have a working fire alarm, fire extinguisher and carbon monoxide detector.  Engage in regular physical activity and social activities.    2. Leukopenia, unspecified type  Mild chronic without neutropenia or other cell line abnormalities. B12/folate deficiency ruled out, no risk factors for HIV/hepatitis C, ADRI negative prior. Monitor prn and annually.     3. Acquired hypothyroidism  Chronic, controlled. He will continue taking 112 mcg of his thyroid medication daily.    4. Hypertension associated with type 2 diabetes mellitus (HCC)  Chronic, controlled. He is advised to continue monitoring his blood pressure at home. If he experiences lightheadedness or if his systolic blood pressure approaches 100, a reduction in his lisinopril dosage may be considered. He is also informed that a dry cough could be a side effect of lisinopril. He will continue his lisinopril 20 mg daily regimen, which is beneficial for preventing kidney dysfunction, particularly in diabetic patients.  - lisinopril (PRINIVIL) 20 MG Tab; Take 1 Tablet by mouth every day.  Dispense: 90 Tablet; Refill: 3    5. Tingling of both feet  Chronic, only at night. His neuropathy test results were normal, and his pulses are satisfactory, suggesting that the tingling sensation is not due to a blood flow issue. The possibility of a back issue causing the tingling was discussed, but he reports no current back pain. He is advised to monitor the tingling sensation and report any changes.    6. Hypophosphatemia  Chronic, unclear etiology. Normal electrolytes otherwise, PTH normal previously.  Vitamin D normal. His phosphorus levels are slightly low, but this is not a significant concern at this time. Fasting can cause low phosphorus levels.    Follow-up: Return if symptoms worsen or fail to improve.

## 2024-12-17 NOTE — PATIENT INSTRUCTIONS
Bring in a copy of advance directives, living will, power of  for your chart or scan into MyChart    Vaccines due that can be received only at pharmacy:  COVID  RSV (respiratory syncytial virus)  Shingrix series (shingles) (2 shot series)    Not covered by Medicare in clinic:  TDaP     Ideal blood pressure <130/80 and at least <140/90.  If <100 for the top number we may be over treating.  Severe range >180/120, with symptoms=ER

## 2025-01-14 DIAGNOSIS — E03.9 ACQUIRED HYPOTHYROIDISM: Chronic | ICD-10-CM

## 2025-01-14 RX ORDER — LEVOTHYROXINE SODIUM 112 UG/1
112 TABLET ORAL DAILY
Qty: 90 TABLET | Refills: 3 | Status: SHIPPED | OUTPATIENT
Start: 2025-01-14

## 2025-01-15 NOTE — TELEPHONE ENCOUNTER
Received request via: Pharmacy    Was the patient seen in the last year in this department? Yes    Does the patient have an active prescription (recently filled or refills available) for medication(s) requested? No    Pharm[facility]:   [id number] - [place] - [address] AT [facility] & [place] [address] [address] [address]  Phone: [phone] Fax: [phone]       Does the patient have [facility] Plus and need 100-day supply? (This applies to ALL medications) Patient does not have SCP

## 2025-02-07 DIAGNOSIS — E11.69 TYPE 2 DIABETES MELLITUS WITH OTHER SPECIFIED COMPLICATION, WITHOUT LONG-TERM CURRENT USE OF INSULIN (HCC): Chronic | ICD-10-CM

## 2025-02-07 RX ORDER — SITAGLIPTIN AND METFORMIN HYDROCHLORIDE 1000; 100 MG/1; MG/1
1 TABLET, FILM COATED, EXTENDED RELEASE ORAL 2 TIMES DAILY
Qty: 180 TABLET | Refills: 3 | Status: SHIPPED | OUTPATIENT
Start: 2025-02-07 | End: 2025-02-20

## 2025-02-07 NOTE — TELEPHONE ENCOUNTER
Received request via: Patient    Was the patient seen in the last year in this department? Yes    Does the patient have an active prescription (recently filled or refills available) for medication(s) requested? No    Pharmacy Name: [facility]    Does the patient have [facility] Plus and need 100-day supply? (This applies to ALL medications) Patient does not have SCP

## 2025-02-20 ENCOUNTER — TELEPHONE (OUTPATIENT)
Dept: MEDICAL GROUP | Facility: LAB | Age: 80
End: 2025-02-20
Payer: MEDICARE

## 2025-02-20 DIAGNOSIS — E11.69 TYPE 2 DIABETES MELLITUS WITH OTHER SPECIFIED COMPLICATION, WITHOUT LONG-TERM CURRENT USE OF INSULIN (HCC): Chronic | ICD-10-CM

## 2025-02-20 RX ORDER — SITAGLIPTIN AND METFORMIN HYDROCHLORIDE 1000; 100 MG/1; MG/1
1 TABLET, FILM COATED, EXTENDED RELEASE ORAL 2 TIMES DAILY
Qty: 180 TABLET | Refills: 3 | Status: SHIPPED | OUTPATIENT
Start: 2025-02-20

## 2025-02-20 NOTE — TELEPHONE ENCOUNTER
[facility]

## 2025-03-07 ENCOUNTER — RESULTS FOLLOW-UP (OUTPATIENT)
Dept: MEDICAL GROUP | Facility: LAB | Age: 80
End: 2025-03-07

## 2025-03-07 ENCOUNTER — OFFICE VISIT (OUTPATIENT)
Dept: MEDICAL GROUP | Facility: LAB | Age: 80
End: 2025-03-07
Payer: MEDICARE

## 2025-03-07 ENCOUNTER — HOSPITAL ENCOUNTER (OUTPATIENT)
Facility: MEDICAL CENTER | Age: 80
End: 2025-03-07
Attending: STUDENT IN AN ORGANIZED HEALTH CARE EDUCATION/TRAINING PROGRAM
Payer: MEDICARE

## 2025-03-07 VITALS
RESPIRATION RATE: 14 BRPM | WEIGHT: 175.1 LBS | DIASTOLIC BLOOD PRESSURE: 74 MMHG | BODY MASS INDEX: 25.07 KG/M2 | HEART RATE: 82 BPM | HEIGHT: 70 IN | OXYGEN SATURATION: 96 % | TEMPERATURE: 98.6 F | SYSTOLIC BLOOD PRESSURE: 124 MMHG

## 2025-03-07 DIAGNOSIS — Z12.83 SCREENING EXAM FOR SKIN CANCER: ICD-10-CM

## 2025-03-07 DIAGNOSIS — M25.512 CHRONIC LEFT SHOULDER PAIN: ICD-10-CM

## 2025-03-07 DIAGNOSIS — G89.29 CHRONIC LEFT SHOULDER PAIN: ICD-10-CM

## 2025-03-07 DIAGNOSIS — E11.69 TYPE 2 DIABETES MELLITUS WITH OTHER SPECIFIED COMPLICATION, WITHOUT LONG-TERM CURRENT USE OF INSULIN (HCC): Chronic | ICD-10-CM

## 2025-03-07 DIAGNOSIS — E03.9 ACQUIRED HYPOTHYROIDISM: Chronic | ICD-10-CM

## 2025-03-07 LAB
HBA1C MFR BLD: 5.8 % (ref ?–5.8)
POCT INT CON NEG: NEGATIVE
POCT INT CON POS: POSITIVE

## 2025-03-07 PROCEDURE — 3078F DIAST BP <80 MM HG: CPT | Performed by: STUDENT IN AN ORGANIZED HEALTH CARE EDUCATION/TRAINING PROGRAM

## 2025-03-07 PROCEDURE — 3074F SYST BP LT 130 MM HG: CPT | Performed by: STUDENT IN AN ORGANIZED HEALTH CARE EDUCATION/TRAINING PROGRAM

## 2025-03-07 PROCEDURE — 82570 ASSAY OF URINE CREATININE: CPT

## 2025-03-07 PROCEDURE — 83036 HEMOGLOBIN GLYCOSYLATED A1C: CPT | Performed by: STUDENT IN AN ORGANIZED HEALTH CARE EDUCATION/TRAINING PROGRAM

## 2025-03-07 PROCEDURE — 82043 UR ALBUMIN QUANTITATIVE: CPT

## 2025-03-07 PROCEDURE — 99214 OFFICE O/P EST MOD 30 MIN: CPT | Performed by: STUDENT IN AN ORGANIZED HEALTH CARE EDUCATION/TRAINING PROGRAM

## 2025-03-07 ASSESSMENT — PATIENT HEALTH QUESTIONNAIRE - PHQ9: CLINICAL INTERPRETATION OF PHQ2 SCORE: 0

## 2025-03-07 ASSESSMENT — ENCOUNTER SYMPTOMS
COUGH: 0
CHILLS: 0
WEIGHT LOSS: 0
SHORTNESS OF BREATH: 0
ABDOMINAL PAIN: 0
DIARRHEA: 0
FEVER: 0
VOMITING: 0
NAUSEA: 0

## 2025-03-07 ASSESSMENT — FIBROSIS 4 INDEX: FIB4 SCORE: 1.96

## 2025-03-07 NOTE — PATIENT INSTRUCTIONS
fasting labs due 1-2 weeks prior to next visit       Ideal blood pressure <130/80.  If <100 for the top number we may be over treating.  Severe range >180/120, with symptoms=ER

## 2025-03-07 NOTE — PROGRESS NOTES
Verbal consent was acquired by the patient to use [employer] ambient listening note generation during this visit Yes.    Subjective:     Chief Complaint   Patient presents with    Diabetes Follow-up     HPI:     History of Present Illness  The patient is a 79-year-old male who presents for a diabetes follow-up, shoulder pain, and skin lesions.    He reports no current issues with his feet, including pain or numbness.  He does experience chronic but stable tingling in his feet but has had evaluation with nerve conduction study which was normal without evidence of neuropathy. He has been managing his diabetes through dietary modifications and physical activity, which includes daily feeding of animals and carrying grain, food, and hay.     He reports no symptoms of hyperthyroidism such as heat intolerance or diarrhea. He also reports no unintentional weight loss.     Approximately 1.5 years ago, he experienced shoulder discomfort. Despite undergoing an 8-week course of physical therapy, there was no improvement in his condition. He subsequently received an injection, which provided relief for a duration of 1.5 years. He is seeking a referral for further evaluation and consideration for another injection. He is not interested in surgical intervention at this time. He is not currently taking any medication for his shoulder pain.    His wife, a nurse, has observed 2 to 3 lesions on his back, which she believes should be evaluated. The lesions have not exhibited any growth or changes in appearance. He reports no family history of skin cancer and no significant sun exposure.    Review of Systems   Constitutional:  Negative for chills, fever, malaise/fatigue and weight loss.   Respiratory:  Negative for cough and shortness of breath.    Cardiovascular:  Negative for chest pain.   Gastrointestinal:  Negative for abdominal pain, diarrhea, nausea and vomiting.   Skin:  Negative for rash.     Objective:     Exam:  /74 (BP  "Location: Right arm, Patient Position: Sitting, BP Cuff Size: Adult)   Pulse 82   Temp 37 °C (98.6 °F)   Resp 14   Ht 1.778 m (5' 10\")   Wt 79.4 kg (175 lb 1.6 oz)   SpO2 96%   BMI 25.12 kg/m²  Body mass index is 25.12 kg/m².    Physical Exam  Vitals reviewed.   Constitutional:       General: He is not in acute distress.     Appearance: Normal appearance. He is not ill-appearing.   HENT:      Head: Normocephalic and atraumatic.      Nose: Nose normal.      Mouth/Throat:      Mouth: Mucous membranes are moist.   Cardiovascular:      Rate and Rhythm: Normal rate and regular rhythm.      Heart sounds: Normal heart sounds. No murmur heard.  Pulmonary:      Effort: Pulmonary effort is normal. No respiratory distress.      Breath sounds: Normal breath sounds. No wheezing, rhonchi or rales.   Musculoskeletal:      Left shoulder: Tenderness present. No swelling, deformity, effusion, bony tenderness or crepitus. Decreased range of motion. Normal strength.      Cervical back: Normal range of motion and neck supple.      Right lower leg: No edema.      Left lower leg: No edema.      Comments: Positive empty can, Neer and Flash Child.  Not able to perform Sedgwick's due to pain. Negative crossarm and lift off.  Mild supraspinatus and posterior cuff tenderness.  No anterior tenderness.   Feet:      Comments: Monofilament testing with a 10 gram force: sensation intact: intact bilaterally  Visual Inspection: Feet without maceration, ulcers, fissures.  Pedal pulses: intact bilaterally  Skin:     General: Skin is warm and dry.      Comments: Rivas type V.  Scattered hyperpigmented dark brown flat papules mid upper back.   Neurological:      General: No focal deficit present.      Mental Status: He is alert and oriented to person, place, and time.   Psychiatric:         Mood and Affect: Mood normal.         Behavior: Behavior normal.         Thought Content: Thought content normal.       Assessment & Plan:     79 y.o. " male with the following -     1. Type 2 diabetes mellitus with other specified complication, without long-term current use of insulin (HCC)  Chronic, controlled. Continue medication(s) as below.  Encouraged diabetic diet and exercise as tolerated.  - A1C in 6 months.   - Annual labs prior to follow-up in September.  - POCT  A1C  - MICROALBUMIN CREAT RATIO URINE; Future  - Comp Metabolic Panel; Future  - Lipid Profile; Future  - CBC WITH DIFFERENTIAL; Future  - HEMOGLOBIN A1C; Future  - VITAMIN B12; Future    SitaGLIPtin-MetFORMIN HCl (JANUMET XR) 100-1000 MG TABLET SR 24 HR, Take 1 Tablet by mouth 2 times a day., Disp: 180 Tablet, Rfl: 3    2. Chronic left shoulder pain  Chronic, suspect impingement and rotator cuff tendinitis. Prior with x-ray in 2023 without any arthritis.  Had significant relief from injection prior lasting a year and a half, referred to sports medicine for consideration of repeat injection.  - Referral to Sports Medicine    3. Acquired hypothyroidism  Chronic, controlled. We discussed what to look out for for symptoms of overtreatment since with diet and exercise he has had some weight loss we may need to trend his TSH sooner.  Otherwise plan to obtain labs in 6 months.  Continue levothyroxine as below.  - TSH WITH REFLEX TO FT4; Future    levothyroxine (SYNTHROID) 112 MCG Tab, Take 1 Tablet by mouth every day., Disp: 90 Tablet, Rfl: 3    4. Screening exam for skin cancer  Papules of concern on patient's back without suspicious appearance, patient agrees to skin exam with dermatologist.  - Referral to Dermatology    Return in about 6 months (around 9/7/2025), or if symptoms worsen or fail to improve, for Diabetes.    Please note that this dictation was created using voice recognition software. I have made every reasonable attempt to correct obvious errors, but I expect that there are errors of grammar and possibly content that I did not discover before finalizing the note.

## 2025-03-08 LAB
CREAT UR-MCNC: 123 MG/DL
MICROALBUMIN UR-MCNC: <1.2 MG/DL
MICROALBUMIN/CREAT UR: NORMAL MG/G (ref 0–30)

## 2025-03-12 NOTE — Clinical Note
REFERRAL APPROVAL NOTICE         Sent on March 12, 2025                   Shay Ventura  [address]  [facility] [address]                   Dear Mr. Ventura,    After a careful review of the medical information and benefit coverage, [facility] has processed your referral. See below for additional details.    If applicable, you must be actively enrolled with your insurance for coverage of the authorized service. If you have any questions regarding your coverage, please contact your insurance directly.    REFERRAL INFORMATION   Referral #:  [id number]  Referred-To Department    Referred-By Provider:  Sports Medicine    Radha Martin D.O.   [facility]      [address]  [place] [address]  [phone] [address]  [place] [address] [phone]    Referral Start Date:  03/07/2025  Referral End Date:   03/07/2026             SCHEDULING  If you do not already have an appointment, please call [phone] to make an appointment.     MORE INFORMATION  If you do not already have a [employer] account, sign up at: [email or web address]  You can access your medical information, make appointments, see lab results, billing information, and more.  If you have questions regarding this referral, please contact  the [facility] Referrals department at:             [phone]. Monday - Friday 8:00AM - 5:00PM.     Sincerely,    [facility]

## 2025-03-13 NOTE — Clinical Note
REFERRAL APPROVAL NOTICE         Sent on March 13, 2025                   Shay Ventura  [address]  [facility] [address]                   Dear Mr. Ventura,    After a careful review of the medical information and benefit coverage, [facility] has processed your referral. See below for additional details.    If applicable, you must be actively enrolled with your insurance for coverage of the authorized service. If you have any questions regarding your coverage, please contact your insurance directly.    REFERRAL INFORMATION   Referral #:  [id number]  Referred-To Department    Referred-By Provider:  Dermatology    Radha Martin D.O.   [facility]      [address]  [place] [address]  [phone] [address]  [place] [id number] [phone]    Referral Start Date:  03/07/2025  Referral End Date:   03/07/2026             SCHEDULING  If you do not already have an appointment, please call [phone] to make an appointment.     MORE INFORMATION  If you do not already have a [employer] account, sign up at: [email or web address]  You can access your medical information, make appointments, see lab results, billing information, and more.  If you have questions regarding this referral, please contact  the [facility] Referrals department at:             [phone]. Monday - Friday 8:00AM - 5:00PM.     Sincerely,    [facility]

## 2025-03-17 SDOH — HEALTH STABILITY: PHYSICAL HEALTH: ON AVERAGE, HOW MANY DAYS PER WEEK DO YOU ENGAGE IN MODERATE TO STRENUOUS EXERCISE (LIKE A BRISK WALK)?: 7 DAYS

## 2025-03-17 SDOH — ECONOMIC STABILITY: INCOME INSECURITY: HOW HARD IS IT FOR YOU TO PAY FOR THE VERY BASICS LIKE FOOD, HOUSING, MEDICAL CARE, AND HEATING?: NOT HARD AT ALL

## 2025-03-17 SDOH — HEALTH STABILITY: PHYSICAL HEALTH: ON AVERAGE, HOW MANY MINUTES DO YOU ENGAGE IN EXERCISE AT THIS LEVEL?: 50 MIN

## 2025-03-17 SDOH — ECONOMIC STABILITY: FOOD INSECURITY: WITHIN THE PAST 12 MONTHS, YOU WORRIED THAT YOUR FOOD WOULD RUN OUT BEFORE YOU GOT MONEY TO BUY MORE.: NEVER TRUE

## 2025-03-17 SDOH — ECONOMIC STABILITY: FOOD INSECURITY: WITHIN THE PAST 12 MONTHS, THE FOOD YOU BOUGHT JUST DIDN'T LAST AND YOU DIDN'T HAVE MONEY TO GET MORE.: NEVER TRUE

## 2025-03-17 SDOH — ECONOMIC STABILITY: INCOME INSECURITY: IN THE LAST 12 MONTHS, WAS THERE A TIME WHEN YOU WERE NOT ABLE TO PAY THE MORTGAGE OR RENT ON TIME?: NO

## 2025-03-17 ASSESSMENT — SOCIAL DETERMINANTS OF HEALTH (SDOH)
HOW MANY DRINKS CONTAINING ALCOHOL DO YOU HAVE ON A TYPICAL DAY WHEN YOU ARE DRINKING: PATIENT DOES NOT DRINK
WITHIN THE PAST 12 MONTHS, YOU WORRIED THAT YOUR FOOD WOULD RUN OUT BEFORE YOU GOT THE MONEY TO BUY MORE: NEVER TRUE
HOW OFTEN DO YOU ATTENT MEETINGS OF THE CLUB OR ORGANIZATION YOU BELONG TO?: NEVER
HOW OFTEN DO YOU ATTENT MEETINGS OF THE CLUB OR ORGANIZATION YOU BELONG TO?: NEVER
IN A TYPICAL WEEK, HOW MANY TIMES DO YOU TALK ON THE PHONE WITH FAMILY, FRIENDS, OR NEIGHBORS?: MORE THAN THREE TIMES A WEEK
DO YOU BELONG TO ANY CLUBS OR ORGANIZATIONS SUCH AS CHURCH GROUPS UNIONS, FRATERNAL OR ATHLETIC GROUPS, OR SCHOOL GROUPS?: NO
HOW OFTEN DO YOU GET TOGETHER WITH FRIENDS OR RELATIVES?: PATIENT DECLINED
IN THE PAST 12 MONTHS, HAS THE ELECTRIC, GAS, OIL, OR WATER COMPANY THREATENED TO SHUT OFF SERVICE IN YOUR HOME?: NO
DO YOU BELONG TO ANY CLUBS OR ORGANIZATIONS SUCH AS CHURCH GROUPS UNIONS, FRATERNAL OR ATHLETIC GROUPS, OR SCHOOL GROUPS?: NO
HOW OFTEN DO YOU ATTEND CHURCH OR RELIGIOUS SERVICES?: NEVER
HOW HARD IS IT FOR YOU TO PAY FOR THE VERY BASICS LIKE FOOD, HOUSING, MEDICAL CARE, AND HEATING?: NOT HARD AT ALL
HOW OFTEN DO YOU ATTEND CHURCH OR RELIGIOUS SERVICES?: NEVER
HOW OFTEN DO YOU HAVE A DRINK CONTAINING ALCOHOL: NEVER
HOW OFTEN DO YOU GET TOGETHER WITH FRIENDS OR RELATIVES?: PATIENT DECLINED
HOW OFTEN DO YOU HAVE SIX OR MORE DRINKS ON ONE OCCASION: NEVER
IN A TYPICAL WEEK, HOW MANY TIMES DO YOU TALK ON THE PHONE WITH FAMILY, FRIENDS, OR NEIGHBORS?: MORE THAN THREE TIMES A WEEK

## 2025-03-17 ASSESSMENT — LIFESTYLE VARIABLES
HOW OFTEN DO YOU HAVE SIX OR MORE DRINKS ON ONE OCCASION: NEVER
HOW OFTEN DO YOU HAVE A DRINK CONTAINING ALCOHOL: NEVER
HOW MANY STANDARD DRINKS CONTAINING ALCOHOL DO YOU HAVE ON A TYPICAL DAY: PATIENT DOES NOT DRINK
AUDIT-C TOTAL SCORE: 0
SKIP TO QUESTIONS 9-10: 1

## 2025-03-18 ENCOUNTER — OFFICE VISIT (OUTPATIENT)
Dept: SPORTS MEDICINE | Facility: OTHER | Age: 80
End: 2025-03-18
Attending: STUDENT IN AN ORGANIZED HEALTH CARE EDUCATION/TRAINING PROGRAM
Payer: MEDICARE

## 2025-03-18 VITALS
SYSTOLIC BLOOD PRESSURE: 118 MMHG | OXYGEN SATURATION: 94 % | RESPIRATION RATE: 18 BRPM | DIASTOLIC BLOOD PRESSURE: 72 MMHG | HEIGHT: 70 IN | BODY MASS INDEX: 25.07 KG/M2 | TEMPERATURE: 98.2 F | HEART RATE: 100 BPM | WEIGHT: 175.1 LBS

## 2025-03-18 DIAGNOSIS — M75.02 ADHESIVE CAPSULITIS OF LEFT SHOULDER: ICD-10-CM

## 2025-03-18 DIAGNOSIS — M43.6 STIFFNESS OF CERVICAL SPINE: ICD-10-CM

## 2025-03-18 PROCEDURE — 20610 DRAIN/INJ JOINT/BURSA W/O US: CPT | Mod: LT | Performed by: FAMILY MEDICINE

## 2025-03-18 PROCEDURE — 3078F DIAST BP <80 MM HG: CPT | Performed by: FAMILY MEDICINE

## 2025-03-18 PROCEDURE — 99213 OFFICE O/P EST LOW 20 MIN: CPT | Mod: 25 | Performed by: FAMILY MEDICINE

## 2025-03-18 PROCEDURE — 3074F SYST BP LT 130 MM HG: CPT | Performed by: FAMILY MEDICINE

## 2025-03-18 RX ORDER — TRIAMCINOLONE ACETONIDE 40 MG/ML
40 INJECTION, SUSPENSION INTRA-ARTICULAR; INTRAMUSCULAR ONCE
Status: COMPLETED | OUTPATIENT
Start: 2025-03-18 | End: 2025-03-18

## 2025-03-18 RX ADMIN — TRIAMCINOLONE ACETONIDE 40 MG: 40 INJECTION, SUSPENSION INTRA-ARTICULAR; INTRAMUSCULAR at 11:50

## 2025-03-18 ASSESSMENT — FIBROSIS 4 INDEX: FIB4 SCORE: 1.96

## 2025-03-18 NOTE — PROCEDURES
PROCEDURE NOTE:  left Shoulder glenohumeral injection  Risks and benefits discussed  Informed consent obtained  Shoulder prepped in sterile fashion utilizing a posterior approach  40 mg of Kenalog and 5 cc of bupivacaine injected into the glenohumeral space  Vapocoolant spray was utilized  Patient tolerated the procedure well  Postprocedure care and red flags discussed

## 2025-03-18 NOTE — PROGRESS NOTES
Chief Complaint   Patient presents with    Shoulder Pain     L shoulder pain      CHIEF COMPLAINT:  Shay Ventura male presenting at the request of Radha Martin D.O.  for evaluation of Shoulder pain.     Shay Ventura is complaining of left shoulder pain (R handed)  Worse over the last 1 month or so (since mid-Feb 2025)  Pain is at the deltoid region  Quality is aching, sharp  Pain is Non-radiating  Aggravated by movement, overhead activity, reaching back, and reaching forward and particularly carrying anything away from his body  Improved with  rest, temporarily, no real alleviating factors  Pain is 7 out of 10  no prior problems with this shoulder in the past that he can recall  Prior Treatments:  Has FAILED formal physical therapy series including 8 weeks of therapy, has had corticosteroid injection approximately year and a half ago which did help for some time  Also saw PCP   prior studies: X-Ray and MRI   Medications tried for pain include: none  Mechanical Symptom history: No Locking  POSITIVE night symptoms with difficulty sleeping on the LEFT side    REVIEW OF SYSTEMS  No Nausea, No Vomiting, No Chest Pain, No Shortness of Breath, No Dizziness, No Headache    PAST MEDICAL HISTORY:   History reviewed. No pertinent past medical history.    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  MEDS:   Current Outpatient Medications:     SitaGLIPtin-MetFORMIN HCl (JANUMET XR) 100-1000 MG TABLET SR 24 HR, Take 1 Tablet by mouth 2 times a day., Disp: 180 Tablet, Rfl: 3    levothyroxine (SYNTHROID) 112 MCG Tab, Take 1 Tablet by mouth every day., Disp: 90 Tablet, Rfl: 3    lisinopril (PRINIVIL) 20 MG Tab, Take 1 Tablet by mouth every day., Disp: 90 Tablet, Rfl: 3    Current Facility-Administered Medications:     " triamcinolone acetonide (Kenalog-40) injection 40 mg, 40 mg, Intra-articular, Once,   ALLERGIES: No Known Allergies  SURGHX:   Past Surgical History:   Procedure Laterality Date    EMG  10/16/2024     SOCHX:  reports that he has never smoked. He has never used smokeless tobacco. He reports that he does not currently use alcohol. He reports that he does not currently use drugs.  FH: Family history was reviewed, no pertinent findings to report     PHYSICAL EXAM:  /72 (BP Location: Left arm, Patient Position: Sitting, BP Cuff Size: Adult)   Pulse 100   Temp 36.8 °C (98.2 °F) (Temporal)   Resp 18   Ht 1.778 m (5' 10\")   Wt 79.4 kg (175 lb 1.6 oz)   SpO2 94%   BMI 25.12 kg/m²      well-developed, well-nourished in no apparent distress, alert and oriented x 3.  Gait: normal    Cervical spine:  Range of motion Markedly limited with Extension and Markedllimited with Lateral rotation  Spurling's testing is NEGATIVE but there is some local cervical spine discomfort on LEFT  Cervical spine tenderness NEGATIVE    Strength testing:     Deltoid, bilateral 5/5  Bicep, bilateral 5/5  Tricep, bilateral 5/5  Wrist Extension, bilateral 5/5  Wrist Flexion, bilateral 5/5  Finger Abduction, bilateral 5/5    Sensation:  INTACT Bilaterally        Reflexes:   Biceps: R 2+/L 2+  Triceps: R 2+/L  2+  Brachial radialis R 2+/L  2+  Palacios's testing is NEGATIVE  The arms are otherwise neurovascularly intact     Shoulder Exam:    RIGHT Shoulder:  No visible swelling   Range of motion INTACT  Tenderness: Non-tender  Empty Can Testing 5/5  Internal Rotation 5/5  External Rotation 5/5  Lift Off Testing 5/5  Impingement testing Child  NEGATIVE  Neer's testing NEGATIVE    LEFT Shoulder:  No visible swelling   Range of motion DIMINISHED with pain  Hard stop with passive external rotation and difficulty with full abduction  Tenderness: Non-tender  Empty Can Testing 5/5  Internal Rotation 5/5  External Rotation 5/5  Lift Off Testing " 5/5  Impingement testing Child  POSITIVE  Neer's testing POSITIVE    Additional Findings: None    1. Adhesive capsulitis of left shoulder  triamcinolone acetonide (Kenalog-40) injection 40 mg      2. Stiffness of cervical spine          Worse over the last 1 month or so (since mid-Feb 2025)  Pain is at the deltoid region    Some of the symptomatology may be coming from the cervical spine   He does have some fairly stiff motion laterally and with extension    Symptoms seem to be more localized to the LEFT shoulder and he does have a history of thyroid disease as well as altered sugars which can be risk factors for frozen shoulder    Home exercise program provided    Return in about 4 weeks (around 4/15/2025).  To see how he is doing after LEFT glenohumeral corticosteroid injection for frozen shoulder  Offered formal physical therapy, but he politely declined since he lives far out      Unremarkable left shoulder  Narrative      CLINICAL DATA: left shoulder pain,    PROCEDURE: XR SHOULDER 2+ VW (COMP) LEFT    COMPARISON: None    FINDINGS:    There is suture material in the left lung apex.  No acute fractures or bone destruction.  Normal alignment.  Preserved joint spaces.  Exam End: 07/11/23 10:01 AM    Specimen Collected: 07/11/23 10:13 AM Last Resulted: 07/11/23 10:14 AM   Received From: [employer]  Result Received: 04/04/24  6:48 AM         MRI Shoulder Left Arthrogram With Contrast  NOT available for review  Order: [id number]  Impression      Fraying of distal articular surface of supraspinatus with surrounding mild tendinopathy.  No rotator cuff tear    Superior labrum nondisplaced tear with superimposed fraying of the superior posterior labrum    Mild intra-articular long biceps tendinopathy    Mild subdeltoid subacromial bursitis.    Type III acromion predisposing  Narrative    EXAM DATE:  10/11/2023 10:30 AM    PROCEDURE: MRI SHOULDER LEFT ARTHROGRAM W CONTRAST,    CLINICAL DATA: Persistent left  shoulder with PT sessions completed,    COMPARISON: correlation with radiographs 7/11/2023.    TECHNIQUE: Multisequence, multiplanar MRI images of the shoulder were obtained following the intra-articular administration of dilute gadolinium contrast on a 1.5 T magnet.    FINDINGS:    TENDONS, MUSCULATURE  Rotator cuff: Distal articular surface of supraspinatus is irregular suggesting mild fraying.  There is surrounding tendon signal inhomogeneity of the insertion consistent with tendinopathy.  No discrete rotator cuff tear.      No muscle edema or muscle atrophy.    Long head biceps tendon: Mild intrinsic signal change consistent with tendinopathy.  No tear or displacement    CORACOACROMIAL ARCH  Acromion: Type III acromion.  AC joint: Mild joint hypertrophy.  No erosions or subluxation  No os acromiale.    There is trace fluid in the subdeltoid-subacromial bursa.  There is no contrast within the bursa.    GLENOHUMERAL JOINT  Joint effusion: The joint is well distended with intra-articular contrast.  No loose body seen Glenoid labrum:Contrast extending along the superior labrum posteriorly consistent with nondisplaced tear.  There is superimposed volume loss and irregularity of the superior-posterior labrum.  There is chronic volume loss of the posterior labrum.  Remaining labrum appears normal  Cartilage: Articular cartilage is well-preserved  Osseous/Bone marrow: No fractures, AVN, or osseous lesion.  OTHER: Small soft tissue edema signal and trace fluid anterior shoulder related to joint injection.  Exam End: 10/11/23 11:24 AM    Specimen Collected: 10/11/23 11:55 AM Last Resulted: 10/11/23 12:05 PM   Received From: [employer]  Result Received: 04/04/24  6:48 AM         Thank you Radha Martin D.O. for allowing me to participate in caring for your patient.

## 2025-03-25 ENCOUNTER — OFFICE VISIT (OUTPATIENT)
Dept: DERMATOLOGY | Facility: IMAGING CENTER | Age: 80
End: 2025-03-25
Payer: MEDICARE

## 2025-03-25 DIAGNOSIS — L29.9 PRURITUS: ICD-10-CM

## 2025-03-25 DIAGNOSIS — Z12.83 SKIN CANCER SCREENING: ICD-10-CM

## 2025-03-25 DIAGNOSIS — L82.1 SEBORRHEIC KERATOSES: ICD-10-CM

## 2025-03-25 PROCEDURE — 99203 OFFICE O/P NEW LOW 30 MIN: CPT | Performed by: STUDENT IN AN ORGANIZED HEALTH CARE EDUCATION/TRAINING PROGRAM

## 2025-03-25 NOTE — PROGRESS NOTES
[facility] DERMATOLOGY CLINIC NOTE    Chief Complaint   Patient presents with    [facility]    Skin Lesion          HPI:    Shay Ventura is a 79 y.o. male presenting today for evaluation of skin growths. Patient wife noticed the spot and was concerned.     1.) Skin growth on: mid back    Duration: x 2-3 weeks    Associated symptoms: itching   Prior treatments: none, noticed by wife       No other symptomatic (itching, painful, burning) or changing lesions.       Dermatology History:      - Skin cancer risk factors: none       - Family history of skin cancer: none     - Prior skin cancers: none       Review of Systems: No fevers, chill. Pertinent positives and negatives above.       Medications, Medical History, Surgical History, Family History & Allergies:  Reviewed in the chart, relevant history noted above.         PHYSICAL EXAM, ASSESSMENT, & PLAN (per problem):   A focused skin exam was performed including the affected areas of the head (including face), neck, and back. Notable findings on exam today listed below and/or in assessment/plan.       Seborrheic keratosis   Exam: scattered tan to brown verrucous papules and plaques on cheeks, back     benign, reassurance       Pruritus  Exam: fine xerosis on back, chest   - Emphasized use of gentle emollients (lotions, creams, ointments) daily to twice daily to affected areas of skin.   - Examples: Cerave, Cetaphil, Eucerin, Aveeno, Dove  - Recommend gentle soaps for sensitive skin, lukewarm water - avoid prolonged exposure to hot water.   - Avoid fragrance in products as possible, including soaps and detergent  - consider amlactin with lactic acid - handout provided        Skin Cancer Prevention Counseling   - advise regular sun protection/sunscreen use, SPF 30 or greater with broad spectrum coverage need for reapplication every  minutes.   - recommend broad brimmed hats, UPF sun protective clothing when outdoors for extended periods of time   - recommend  self checks at home,  ABCDEs of melanoma discussed   - handouts provided at visit         Follow up: Return if symptoms worsen or fail to improve.        Rebeca Szymanski MD   [facility] Dermatology

## 2025-03-26 ENCOUNTER — PATIENT MESSAGE (OUTPATIENT)
Dept: MEDICAL GROUP | Facility: LAB | Age: 80
End: 2025-03-26
Payer: MEDICARE

## 2025-03-26 DIAGNOSIS — E11.69 TYPE 2 DIABETES MELLITUS WITH OTHER SPECIFIED COMPLICATION, WITHOUT LONG-TERM CURRENT USE OF INSULIN (HCC): Chronic | ICD-10-CM

## 2025-03-26 RX ORDER — SITAGLIPTIN AND METFORMIN HYDROCHLORIDE 1000; 100 MG/1; MG/1
1 TABLET, FILM COATED, EXTENDED RELEASE ORAL 2 TIMES DAILY
Qty: 180 TABLET | Refills: 3 | Status: SHIPPED | OUTPATIENT
Start: 2025-03-26

## 2025-04-01 ENCOUNTER — TELEPHONE (OUTPATIENT)
Dept: MEDICAL GROUP | Facility: LAB | Age: 80
End: 2025-04-01
Payer: MEDICARE

## 2025-04-01 DIAGNOSIS — E11.69 TYPE 2 DIABETES MELLITUS WITH OTHER SPECIFIED COMPLICATION, WITHOUT LONG-TERM CURRENT USE OF INSULIN (HCC): Chronic | ICD-10-CM

## 2025-05-30 ENCOUNTER — PATIENT MESSAGE (OUTPATIENT)
Dept: MEDICAL GROUP | Facility: LAB | Age: 80
End: 2025-05-30
Payer: MEDICARE

## 2025-05-30 DIAGNOSIS — E11.69 TYPE 2 DIABETES MELLITUS WITH OTHER SPECIFIED COMPLICATION, WITHOUT LONG-TERM CURRENT USE OF INSULIN (HCC): Chronic | ICD-10-CM

## 2025-05-30 DIAGNOSIS — E03.9 ACQUIRED HYPOTHYROIDISM: Chronic | ICD-10-CM

## 2025-05-30 RX ORDER — LEVOTHYROXINE SODIUM 112 UG/1
112 TABLET ORAL DAILY
Qty: 90 TABLET | Refills: 3 | Status: SHIPPED | OUTPATIENT
Start: 2025-05-30

## 2025-07-01 ENCOUNTER — APPOINTMENT (OUTPATIENT)
Dept: MEDICAL GROUP | Facility: LAB | Age: 80
End: 2025-07-01
Payer: MEDICARE

## 2025-07-01 VITALS
OXYGEN SATURATION: 97 % | DIASTOLIC BLOOD PRESSURE: 54 MMHG | SYSTOLIC BLOOD PRESSURE: 114 MMHG | WEIGHT: 172 LBS | HEART RATE: 70 BPM | RESPIRATION RATE: 16 BRPM | BODY MASS INDEX: 24.62 KG/M2 | HEIGHT: 70 IN | TEMPERATURE: 97.2 F

## 2025-07-01 DIAGNOSIS — N40.1 BENIGN PROSTATIC HYPERPLASIA WITH LOWER URINARY TRACT SYMPTOMS, SYMPTOM DETAILS UNSPECIFIED: ICD-10-CM

## 2025-07-01 DIAGNOSIS — R42 DIZZINESS: Primary | ICD-10-CM

## 2025-07-01 DIAGNOSIS — Z12.5 SCREENING FOR MALIGNANT NEOPLASM OF PROSTATE: ICD-10-CM

## 2025-07-01 DIAGNOSIS — E83.39 HYPOPHOSPHATEMIA: ICD-10-CM

## 2025-07-01 PROCEDURE — 3074F SYST BP LT 130 MM HG: CPT | Performed by: STUDENT IN AN ORGANIZED HEALTH CARE EDUCATION/TRAINING PROGRAM

## 2025-07-01 PROCEDURE — 99214 OFFICE O/P EST MOD 30 MIN: CPT | Performed by: STUDENT IN AN ORGANIZED HEALTH CARE EDUCATION/TRAINING PROGRAM

## 2025-07-01 PROCEDURE — 3078F DIAST BP <80 MM HG: CPT | Performed by: STUDENT IN AN ORGANIZED HEALTH CARE EDUCATION/TRAINING PROGRAM

## 2025-07-01 ASSESSMENT — ENCOUNTER SYMPTOMS
DIZZINESS: 1
FEVER: 0
NAUSEA: 1
HEARTBURN: 0
BLURRED VISION: 1
COUGH: 0
CHILLS: 0
WEIGHT LOSS: 0
DIARRHEA: 0
FLANK PAIN: 0
SHORTNESS OF BREATH: 0
FALLS: 0
HEADACHES: 0
ABDOMINAL PAIN: 0
VOMITING: 0
CONSTIPATION: 0

## 2025-07-01 ASSESSMENT — FIBROSIS 4 INDEX: FIB4 SCORE: 1.96

## 2025-07-01 NOTE — PATIENT INSTRUCTIONS
fasting labs-have them done now/early due to your symptoms    Ideal blood pressure <130/80.  If <100 for the top number we may be over treating.  Severe range >180/120, with symptoms=ER    Ideal glucose ranges: Fasting (8-10 hours) , random (1.5-2 hours after eating) <180. If glucose <70, we need to adjust your medication to prevent continuing low blood sugar. <60 hypoglycemia  Reduce Janumet to 1 tablet a day

## 2025-07-01 NOTE — PROGRESS NOTES
Verbal consent was acquired by the patient to use [employer] ambient listening note generation during this visit Yes.    Subjective:     Chief Complaint   Patient presents with    Dizziness     Nausea/ vision problems X 3-4 weeks comes and goes     Urinary Retention     HPI:     History of Present Illness  The patient is a 79-year-old male who presents with complaints of urinary symptoms and dizziness.    He has been experiencing episodes of dizziness, accompanied by nausea and blurred vision, which typically occur when he is seated at home, such as while watching television. These episodes are sporadic but concerning due to the simultaneous onset of nausea and visual disturbances. He describes a sensation of the room spinning, akin to vertigo, and sometimes lightheadedness. The episodes last approximately 5 to 10 minutes, during which he remains still until his vision clears. He also reports intermittent nausea unrelated to the dizziness. He does not experience vomiting, diarrhea, constipation, or heartburn. The dizzy spells occur a few times a week, primarily when he is sitting and not during physical activity. Sudden head movements seem to trigger these episodes. He does not experience headaches. He monitors his blood pressure and blood sugar levels during these episodes, which typically range from the low 100s to 120s. He occasionally skips breakfast, which can lead to nausea that improves after eating. His blood sugar levels do not drop during these episodes. He does not have any falls, chest pain, or breathing difficulties. He had an eye examination six months ago. He does not experience speech problems or weakness in any specific area. The dizziness is more pronounced in the early evening, particularly after dinner. He feels dizzy when transitioning from lying to sitting. He has a history of benign paroxysmal positional vertigo (BPPV). He has been under the care of the [facility] for several years due to chronic nausea and liver monitoring.    He reports a slow urinary stream, often reduced to a dribble, and prolonged urination time. He expresses concern about potential prostate issues. He does not experience blood in his urine or burning sensation during urination. He has previously tried finasteride without success and has not taken Flomax. These issues are chronic, not worse from prior.    Review of Systems   Constitutional:  Negative for chills, fever, malaise/fatigue and weight loss.   Eyes:  Positive for blurred vision.   Respiratory:  Negative for cough and shortness of breath.    Cardiovascular:  Negative for chest pain.   Gastrointestinal:  Positive for nausea. Negative for abdominal pain, constipation, diarrhea, heartburn and vomiting.   Genitourinary:  Positive for frequency and urgency. Negative for dysuria, flank pain and hematuria.   Musculoskeletal:  Negative for falls.   Skin:  Negative for rash.   Neurological:  Positive for dizziness. Negative for headaches.     Objective:     Exam:  /54 (BP Location: Left arm, Patient Position: Sitting, BP Cuff Size: Adult)   Pulse 70   Temp 36.2 °C (97.2 °F)   Resp 16   Ht 1.778 m (5' 10\")   Wt 78 kg (172 lb)   SpO2 97%   BMI 24.68 kg/m²  Body mass index is 24.68 kg/m².    Physical Exam  Vitals reviewed.   Constitutional:       General: He is not in acute distress.     Appearance: Normal appearance. He is not ill-appearing.   HENT:      Head: Normocephalic and atraumatic.      Right Ear: Tympanic membrane, ear canal and external ear normal.      Left Ear: Tympanic membrane, ear canal and external ear normal.      Nose: Nose normal.      Mouth/Throat:      Mouth: Mucous membranes are moist.      Pharynx: No oropharyngeal exudate or posterior oropharyngeal erythema.   Eyes:      General: No scleral icterus.     Conjunctiva/sclera: Conjunctivae normal.   Neck:      Vascular: No carotid bruit.   Cardiovascular:      Rate " and Rhythm: Normal rate and regular rhythm.      Heart sounds: Normal heart sounds. No murmur heard.  Pulmonary:      Effort: Pulmonary effort is normal. No respiratory distress.      Breath sounds: Normal breath sounds. No stridor. No wheezing, rhonchi or rales.   Abdominal:      General: Abdomen is flat. Bowel sounds are normal. There is no distension.      Palpations: Abdomen is soft. There is no mass.      Tenderness: There is no abdominal tenderness. There is no guarding or rebound.      Hernia: No hernia is present.   Musculoskeletal:      Cervical back: Normal range of motion and neck supple. No rigidity or tenderness.      Right lower leg: No edema.      Left lower leg: No edema.   Lymphadenopathy:      Cervical: No cervical adenopathy.   Skin:     General: Skin is warm and dry.      Coloration: Skin is not jaundiced or pale.   Neurological:      General: No focal deficit present.      Mental Status: He is alert and oriented to person, place, and time.      Cranial Nerves: No cranial nerve deficit, dysarthria or facial asymmetry.      Coordination: Romberg sign negative.      Gait: Gait normal.      Comments: Normal cover/uncover and head thrust.   Psychiatric:         Mood and Affect: Mood normal.         Behavior: Behavior normal.         Thought Content: Thought content normal.       Reviewed most recent/relevant labs/imaging.    Assessment & Plan:     79 y.o. male with the following -     1. Dizziness  Acute occurring often with vision disturbances and nausea lasting up to 10 min without other associated symptoms for the past 3-4 weeks. Occurs mainly at rest but often associated with head movements, no symptoms with exertion.   - Blood pressure readings are within the normal range today, but dizziness could be attributed to hypotension.  - A1c level is well-controlled at 5.8%, suggesting current medication regimen may be overly aggressive, potentially leading to unrecognized hypoglycemia. No instances of  low blood sugar reported during episodes.  - Orthostatic hypotension/tacycardia ruled out.   - Comprehensive lab workup will be conducted to evaluate CBC, CMP, TSH and A1c levels (already ordered along with lipid panel)-obtain ASAP.  - Symptoms could be side effects of medications. Janumet dosage will be reduced to one tablet daily to assess if this alleviates symptoms. Could consider trial of reducing lisinopril, but BP very well controlled without orthostatic hypotension.  - Could consider imaging, but neurologic exam is non focal/normal. Has hx of BPPV, but duration seems unusual for it do just be that. Discussed ER precautions.     2. Benign prostatic hyperplasia with lower urinary tract symptoms, symptom details unspecified  Chronic, uncontrolled. See I-PSS in media.  - Previously tried finasteride without success and has not taken Flomax. Referral to urologist will be made for further evaluation and management.  - Ultrasound of the bladder will be ordered to assess for abnormalities such as incomplete bladder emptying. Urine analysis will be conducted to rule out other contributing factors.  - Discussed trial of flomax, but we agree to defer for now given dizziness as above.  - URINALYSIS,CULTURE IF INDICATED; Future  - Referral to Urology  - US-BLADDER; Future    3. Screening for malignant neoplasm of prostate  - PROSTATE SPECIFIC AG SCREENING; Future    4. Hypophosphatemia  Chronic, suspected related to fasting but trend.  - PHOSPHORUS; Future    I spent a total of 32 minutes with record review, exam, communication with the patient, and documentation of this encounter.    Return in about 16 days (around 7/17/2025), or if symptoms worsen or fail to improve, for follow up.    Please note that this dictation was created using voice recognition software. I have made every reasonable attempt to correct obvious errors, but I expect that there are errors of grammar and possibly content that I did not discover  before finalizing the note.

## 2025-07-02 ENCOUNTER — HOSPITAL ENCOUNTER (OUTPATIENT)
Dept: LAB | Facility: MEDICAL CENTER | Age: 80
End: 2025-07-02
Attending: STUDENT IN AN ORGANIZED HEALTH CARE EDUCATION/TRAINING PROGRAM
Payer: MEDICARE

## 2025-07-02 DIAGNOSIS — N40.1 BENIGN PROSTATIC HYPERPLASIA WITH LOWER URINARY TRACT SYMPTOMS, SYMPTOM DETAILS UNSPECIFIED: ICD-10-CM

## 2025-07-02 DIAGNOSIS — E03.9 ACQUIRED HYPOTHYROIDISM: Chronic | ICD-10-CM

## 2025-07-02 DIAGNOSIS — E83.39 HYPOPHOSPHATEMIA: ICD-10-CM

## 2025-07-02 DIAGNOSIS — E11.69 TYPE 2 DIABETES MELLITUS WITH OTHER SPECIFIED COMPLICATION, WITHOUT LONG-TERM CURRENT USE OF INSULIN (HCC): Chronic | ICD-10-CM

## 2025-07-02 DIAGNOSIS — Z12.5 SCREENING FOR MALIGNANT NEOPLASM OF PROSTATE: ICD-10-CM

## 2025-07-02 LAB
ALBUMIN SERPL BCP-MCNC: 4.5 G/DL (ref 3.2–4.9)
ALBUMIN/GLOB SERPL: 1.4 G/DL
ALP SERPL-CCNC: 45 U/L (ref 30–99)
ALT SERPL-CCNC: 53 U/L (ref 2–50)
ANION GAP SERPL CALC-SCNC: 11 MMOL/L (ref 7–16)
APPEARANCE UR: CLEAR
AST SERPL-CCNC: 34 U/L (ref 12–45)
BACTERIA #/AREA URNS HPF: ABNORMAL /HPF
BASOPHILS # BLD AUTO: 0.6 % (ref 0–1.8)
BASOPHILS # BLD: 0.02 K/UL (ref 0–0.12)
BILIRUB SERPL-MCNC: 1.2 MG/DL (ref 0.1–1.5)
BILIRUB UR QL STRIP.AUTO: NEGATIVE
BUN SERPL-MCNC: 16 MG/DL (ref 8–22)
CALCIUM ALBUM COR SERPL-MCNC: 9.4 MG/DL (ref 8.5–10.5)
CALCIUM SERPL-MCNC: 9.8 MG/DL (ref 8.5–10.5)
CASTS URNS QL MICRO: ABNORMAL /LPF (ref 0–2)
CHLORIDE SERPL-SCNC: 104 MMOL/L (ref 96–112)
CHOLEST SERPL-MCNC: 102 MG/DL (ref 100–199)
CO2 SERPL-SCNC: 23 MMOL/L (ref 20–33)
COLOR UR: YELLOW
CREAT SERPL-MCNC: 1.07 MG/DL (ref 0.5–1.4)
EOSINOPHIL # BLD AUTO: 0.03 K/UL (ref 0–0.51)
EOSINOPHIL NFR BLD: 0.9 % (ref 0–6.9)
EPITHELIAL CELLS 1715: ABNORMAL /HPF (ref 0–5)
ERYTHROCYTE [DISTWIDTH] IN BLOOD BY AUTOMATED COUNT: 47.7 FL (ref 35.9–50)
EST. AVERAGE GLUCOSE BLD GHB EST-MCNC: 126 MG/DL
GFR SERPLBLD CREATININE-BSD FMLA CKD-EPI: 71 ML/MIN/1.73 M 2
GLOBULIN SER CALC-MCNC: 3.3 G/DL (ref 1.9–3.5)
GLUCOSE SERPL-MCNC: 98 MG/DL (ref 65–99)
GLUCOSE UR STRIP.AUTO-MCNC: NEGATIVE MG/DL
HBA1C MFR BLD: 6 % (ref 4–5.6)
HCT VFR BLD AUTO: 45.1 % (ref 42–52)
HDLC SERPL-MCNC: 44 MG/DL
HGB BLD-MCNC: 14.6 G/DL (ref 14–18)
IMM GRANULOCYTES # BLD AUTO: 0 K/UL (ref 0–0.11)
IMM GRANULOCYTES NFR BLD AUTO: 0 % (ref 0–0.9)
KETONES UR STRIP.AUTO-MCNC: ABNORMAL MG/DL
LDLC SERPL CALC-MCNC: 52 MG/DL
LEUKOCYTE ESTERASE UR QL STRIP.AUTO: NEGATIVE
LYMPHOCYTES # BLD AUTO: 1.48 K/UL (ref 1–4.8)
LYMPHOCYTES NFR BLD: 44 % (ref 22–41)
MCH RBC QN AUTO: 29.9 PG (ref 27–33)
MCHC RBC AUTO-ENTMCNC: 32.4 G/DL (ref 32.3–36.5)
MCV RBC AUTO: 92.2 FL (ref 81.4–97.8)
MICRO URNS: ABNORMAL
MONOCYTES # BLD AUTO: 0.28 K/UL (ref 0–0.85)
MONOCYTES NFR BLD AUTO: 8.3 % (ref 0–13.4)
NEUTROPHILS # BLD AUTO: 1.55 K/UL (ref 1.82–7.42)
NEUTROPHILS NFR BLD: 46.2 % (ref 44–72)
NITRITE UR QL STRIP.AUTO: NEGATIVE
NRBC # BLD AUTO: 0 K/UL
NRBC BLD-RTO: 0 /100 WBC (ref 0–0.2)
PH UR STRIP.AUTO: 6 [PH] (ref 5–8)
PHOSPHATE SERPL-MCNC: 2 MG/DL (ref 2.5–4.5)
PLATELET # BLD AUTO: 201 K/UL (ref 164–446)
PMV BLD AUTO: 9.5 FL (ref 9–12.9)
POTASSIUM SERPL-SCNC: 4.7 MMOL/L (ref 3.6–5.5)
PROT SERPL-MCNC: 7.8 G/DL (ref 6–8.2)
PROT UR QL STRIP: NEGATIVE MG/DL
PSA SERPL DL<=0.01 NG/ML-MCNC: 1.2 NG/ML (ref 0–4)
RBC # BLD AUTO: 4.89 M/UL (ref 4.7–6.1)
RBC # URNS HPF: ABNORMAL /HPF (ref 0–2)
RBC UR QL AUTO: ABNORMAL
SODIUM SERPL-SCNC: 138 MMOL/L (ref 135–145)
SP GR UR STRIP.AUTO: 1.03
TRIGL SERPL-MCNC: 32 MG/DL (ref 0–149)
TSH SERPL DL<=0.005 MIU/L-ACNC: 1.19 UIU/ML (ref 0.38–5.33)
UROBILINOGEN UR STRIP.AUTO-MCNC: 1 EU/DL
VIT B12 SERPL-MCNC: 1266 PG/ML (ref 211–911)
WBC # BLD AUTO: 3.4 K/UL (ref 4.8–10.8)
WBC #/AREA URNS HPF: ABNORMAL /HPF

## 2025-07-02 PROCEDURE — 83036 HEMOGLOBIN GLYCOSYLATED A1C: CPT | Mod: GA

## 2025-07-02 PROCEDURE — 84153 ASSAY OF PSA TOTAL: CPT | Mod: GA

## 2025-07-02 PROCEDURE — 80053 COMPREHEN METABOLIC PANEL: CPT

## 2025-07-02 PROCEDURE — 84443 ASSAY THYROID STIM HORMONE: CPT

## 2025-07-02 PROCEDURE — 36415 COLL VENOUS BLD VENIPUNCTURE: CPT

## 2025-07-02 PROCEDURE — 85025 COMPLETE CBC W/AUTO DIFF WBC: CPT

## 2025-07-02 PROCEDURE — 80061 LIPID PANEL: CPT

## 2025-07-02 PROCEDURE — 82607 VITAMIN B-12: CPT

## 2025-07-02 PROCEDURE — 81001 URINALYSIS AUTO W/SCOPE: CPT

## 2025-07-02 PROCEDURE — 84100 ASSAY OF PHOSPHORUS: CPT

## 2025-07-05 ENCOUNTER — HOSPITAL ENCOUNTER (OUTPATIENT)
Dept: RADIOLOGY | Facility: MEDICAL CENTER | Age: 80
End: 2025-07-05
Attending: STUDENT IN AN ORGANIZED HEALTH CARE EDUCATION/TRAINING PROGRAM
Payer: MEDICARE

## 2025-07-05 DIAGNOSIS — N40.1 BENIGN PROSTATIC HYPERPLASIA WITH LOWER URINARY TRACT SYMPTOMS, SYMPTOM DETAILS UNSPECIFIED: ICD-10-CM

## 2025-07-05 PROCEDURE — 76857 US EXAM PELVIC LIMITED: CPT

## 2025-07-09 NOTE — Clinical Note
REFERRAL APPROVAL NOTICE         Sent on July 9, 2025                   Shay Ventura  [address]  [facility] [address]                   Dear Mr. Ventura,    After a careful review of the medical information and benefit coverage, [facility] has processed your referral. See below for additional details.    If applicable, you must be actively enrolled with your insurance for coverage of the authorized service. If you have any questions regarding your coverage, please contact your insurance directly.    REFERRAL INFORMATION   Referral #:  [id number]  Referred-To Department    Referred-By Provider:  Urology    [facility]      [address]  [place] [address]  [phone] [address]  [place] [address] [phone]    Referral Start Date:  07/01/2025  Referral End Date:   07/01/2026             SCHEDULING  If you do not already have an appointment, please call [phone] to make an appointment.     MORE INFORMATION  If you do not already have a [employer] account, sign up at: [email or web address]  You can access your medical information, make appointments, see lab results, billing information, and more.  If you have questions regarding this referral, please contact  the [facility] Referrals department at:             [phone]. Monday - Friday 8:00AM - 5:00PM.     Sincerely,    [facility]

## 2025-07-11 ENCOUNTER — OFFICE VISIT (OUTPATIENT)
Dept: UROLOGY | Facility: MEDICAL CENTER | Age: 80
End: 2025-07-11
Payer: MEDICARE

## 2025-07-11 DIAGNOSIS — N40.1 BENIGN PROSTATIC HYPERPLASIA WITH LOWER URINARY TRACT SYMPTOMS, SYMPTOM DETAILS UNSPECIFIED: Primary | ICD-10-CM

## 2025-07-11 DIAGNOSIS — R31.29 MICROHEMATURIA: ICD-10-CM

## 2025-07-11 LAB
POC POST-VOID: 170 ML
POC PRE-VOID: NORMAL

## 2025-07-11 PROCEDURE — 51798 US URINE CAPACITY MEASURE: CPT

## 2025-07-11 PROCEDURE — 99204 OFFICE O/P NEW MOD 45 MIN: CPT

## 2025-07-11 NOTE — PROGRESS NOTES
"        Subjective  Shay Ventura is a 79 y.o. male who presents today for evaluation of bothersome lower urinary tract symptoms.  He  was seen in primary care on July 1, 2025 where he was reporting slow urinary stream, dribbling, and prolonged urination time.  At that time he was denying dysuria,  and hematuria    His PSA on July 2, 2025 was 1.2    He started taking Flomax 0.4 mg p.o. yesterday.  He was on finasteride without success- he stopped finasteride >2 years ago    Today, he reports a weak urinary stream, and urgency with leakage, along with slow urinary flow.    He denies nocturia, recent UTI, hematuria- Micro UA positive 3-5 RBC 07/02/2025  He denies any gross hematuria. He is a nonsmoker, no history of bladder cancer, or history of kidney stones.    He denies ED- able to get an erection, maintain, and ejaculate.     His most bothersome complaint is a \"slow urinary flow\"    Family History   Problem Relation Age of Onset    Thyroid Mother         hypo    Diabetes Father     Hypertension Father     Alcohol abuse Brother     Diabetes Paternal Aunt     Diabetes Paternal Uncle     Diabetes Paternal Grandmother        Social History     Socioeconomic History    Marital status: Unknown     Spouse name: Not on file    Number of children: Not on file    Years of education: Not on file    Highest education level: Associate degree: academic program   Occupational History    Not on file   Tobacco Use    Smoking status: Never    Smokeless tobacco: Never   Vaping Use    Vaping status: Never Used   Substance and Sexual Activity    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    Sexual activity: Yes     Partners: Female   Other Topics Concern    Not on file   Social History Narrative    Not on file     Social Drivers of Health     Financial Resource Strain: Low Risk  (3/17/2025)    Overall Financial Resource Strain (CARDIA)     Difficulty of Paying " Living Expenses: Not hard at all   Food Insecurity: No Food Insecurity (3/17/2025)    Hunger Vital Sign     Worried About Running Out of Food in the Last Year: Never true     Ran Out of Food in the Last Year: Never true   Transportation Needs: No Transportation Needs (3/17/2025)    PRAPARE - Transportation     Lack of Transportation (Medical): No     Lack of Transportation (Non-Medical): No   Physical Activity: Sufficiently Active (3/17/2025)    Exercise Vital Sign     Days of Exercise per Week: 7 days     Minutes of Exercise per Session: 50 min   Stress: No Stress Concern Present (3/17/2025)    [employer] - Occupational Stress Questionnaire     Feeling of Stress : Not at all   Social Connections: Moderately Isolated (3/17/2025)    Social Connection and Isolation Panel [NHANES]     Frequency of Communication with Friends and Family: More than three times a week     Frequency of Social Gatherings with Friends and Family: Patient declined     Attends Alevism Services: Never     Active Member of Clubs or Organizations: No     Attends Club or Organization Meetings: Never     Marital Status:    Intimate Partner Violence: Not on file   Housing Stability: Unknown (3/17/2025)    Housing Stability Vital Sign     Unable to Pay for Housing in the Last Year: No     Number of Times Moved in the Last Year: Not on file     Homeless in the Last Year: No       Past Surgical History[1]    Past Medical History[2]    Current Medications[3]    Allergies[4]    Objective  There were no vitals taken for this visit.  Physical Exam  Constitutional:       Appearance: Normal appearance.   HENT:      Head: Normocephalic and atraumatic.   Eyes:      Extraocular Movements: Extraocular movements intact.   Pulmonary:      Effort: Pulmonary effort is normal.   Genitourinary:     Comments:   Skin:     General: Skin is warm and dry.   Neurological:      Mental Status: He is alert.         Labs:   Micro  urinalysis done at the beginning of the month showed 3-5 red blood cells, this could be related to suspicious lesion, or prostate    Imaging:   none    Assessment    For microhematuria: Recommend CT urogram, along with cystoscopy for red blood cell 3-5.  We will be ruling out that hematuria is related to prostate versus suspicious lesions.  CT urogram to be completed prior to cystoscopy.  Patient agreed to cystoscopy    For bothersome lower urinary tract symptoms: Continue Flomax 0.4 mg p.o. nightly.  We discussed taking this medication at night can decrease dizziness with his BPPV history.  We also discussed that use of Flomax can cause retrograde ejaculation.  We will see if this medication makes an overall difference in the next 6 weeks.  At follow-up, goal is to see improvement in his urinary stream, and slow flow that is most bothersome to him.  May need to discuss surgical interventions if appropriate.  Can trial tadalafil 5 mg p.o. nightly if Flomax ineffective, or increased Flomax from 0.4 to 0.8 mg p.o. nightly    Return to clinic in 6 weeks    PVR discussed 170 mL-should see improvement with Flomax.      Plan  Return to clinic in 6 weeks  PVR check  Continue Flomax 0.4 mg p.o. nightly  CT urogram to be completed prior to cystoscopy  Return to clinic for cystoscopy for micro heme workup    Problem List Items Addressed This Visit    None             [1]   Past Surgical History:  Procedure Laterality Date    EMG/NCS  10/16/2024   [2]   Past Medical History:  Diagnosis Date    Diabetes (HCC)     Elevated bilirubin 02/01/2024    High alanine aminotransferase (ALT) level 11/25/2012    Hypertension     Neuropathy 01/08/2024    Last Assessment & Plan: Formatting of this note might be different from the original. Secondary to diabetes Gabapentin 600 mg daily not helping Patient requesting referral to pain medicine for consultation Discussed other modalities with patient including increasing gabapentin dose,  duloxetine, Lyrica, L-lysine + turmeric supplementation, patient would rather have a consultation with pain medicine    Pneumothorax on left     Stage 3a chronic kidney disease 09/13/2023    Formatting of this note might be different from the original. 9/2023 Crt 1.3 GFR 56 9/2022 Crt 1.2 GFR > 60 K 4.2 Last Assessment & Plan: Formatting of this note might be different from the original. Recent decrease in GFR unclear if true CKD3 vs. Hydration status Recheck in 3-6 mos   [3]   Current Outpatient Medications   Medication Sig Dispense Refill    tamsulosin (FLOMAX) 0.4 MG capsule Take 1 Capsule by mouth 1/2 hour after breakfast. 90 Capsule 3    SitaGLIPtin-MetFORMIN HCl  MG TABLET SR 24 HR Take 2 Tablets by mouth every day. 180 Tablet 3    levothyroxine (SYNTHROID) 112 MCG Tab Take 1 Tablet by mouth every day. 90 Tablet 3    lisinopril (PRINIVIL) 20 MG Tab Take 1 Tablet by mouth every day. 90 Tablet 3     No current facility-administered medications for this visit.   [4] No Known Allergies

## 2025-07-15 ENCOUNTER — TELEPHONE (OUTPATIENT)
Dept: MEDICAL GROUP | Facility: LAB | Age: 80
End: 2025-07-15
Payer: MEDICARE

## 2025-07-15 NOTE — TELEPHONE ENCOUNTER
Patient is having a CT on 7/23/2025. Patient is very claustrophobic, and is wondering if you can send in something for him to stay calm.

## 2025-07-17 ENCOUNTER — OFFICE VISIT (OUTPATIENT)
Dept: MEDICAL GROUP | Facility: LAB | Age: 80
End: 2025-07-17
Payer: MEDICARE

## 2025-07-17 VITALS
OXYGEN SATURATION: 97 % | TEMPERATURE: 96.9 F | BODY MASS INDEX: 25.4 KG/M2 | WEIGHT: 177.4 LBS | HEART RATE: 67 BPM | DIASTOLIC BLOOD PRESSURE: 60 MMHG | RESPIRATION RATE: 16 BRPM | HEIGHT: 70 IN | SYSTOLIC BLOOD PRESSURE: 116 MMHG

## 2025-07-17 DIAGNOSIS — E03.9 ACQUIRED HYPOTHYROIDISM: Chronic | ICD-10-CM

## 2025-07-17 DIAGNOSIS — N40.1 BENIGN PROSTATIC HYPERPLASIA WITH LOWER URINARY TRACT SYMPTOMS, SYMPTOM DETAILS UNSPECIFIED: ICD-10-CM

## 2025-07-17 DIAGNOSIS — E83.39 HYPOPHOSPHATEMIA: ICD-10-CM

## 2025-07-17 DIAGNOSIS — R31.29 MICROSCOPIC HEMATURIA: ICD-10-CM

## 2025-07-17 DIAGNOSIS — R74.01 ELEVATED ALANINE AMINOTRANSFERASE (ALT) LEVEL: ICD-10-CM

## 2025-07-17 DIAGNOSIS — F40.240 CLAUSTROPHOBIA: ICD-10-CM

## 2025-07-17 DIAGNOSIS — E11.69 TYPE 2 DIABETES MELLITUS WITH OTHER SPECIFIED COMPLICATION, WITHOUT LONG-TERM CURRENT USE OF INSULIN (HCC): Chronic | ICD-10-CM

## 2025-07-17 DIAGNOSIS — D72.819 LEUKOPENIA, UNSPECIFIED TYPE: ICD-10-CM

## 2025-07-17 PROCEDURE — 99214 OFFICE O/P EST MOD 30 MIN: CPT | Performed by: STUDENT IN AN ORGANIZED HEALTH CARE EDUCATION/TRAINING PROGRAM

## 2025-07-17 PROCEDURE — 3078F DIAST BP <80 MM HG: CPT | Performed by: STUDENT IN AN ORGANIZED HEALTH CARE EDUCATION/TRAINING PROGRAM

## 2025-07-17 PROCEDURE — 3074F SYST BP LT 130 MM HG: CPT | Performed by: STUDENT IN AN ORGANIZED HEALTH CARE EDUCATION/TRAINING PROGRAM

## 2025-07-17 RX ORDER — LORAZEPAM 0.5 MG/1
0.5 TABLET ORAL
Qty: 1 TABLET | Refills: 0 | Status: SHIPPED | OUTPATIENT
Start: 2025-07-17 | End: 2025-07-18

## 2025-07-17 ASSESSMENT — ENCOUNTER SYMPTOMS
NAUSEA: 0
ABDOMINAL PAIN: 0
SHORTNESS OF BREATH: 0
WEIGHT LOSS: 0
COUGH: 0
DIZZINESS: 0
VOMITING: 0
FEVER: 0
CHILLS: 0
DIARRHEA: 0

## 2025-07-17 ASSESSMENT — FIBROSIS 4 INDEX: FIB4 SCORE: 1.79

## 2025-07-17 NOTE — PROGRESS NOTES
"Verbal consent was acquired by the patient to use [employer] ambient listening note generation during this visit Yes.    Subjective:     Chief Complaint   Patient presents with    Follow-Up     Medication  Lab Results       HPI:     History of Present Illness  The patient is a 79-year-old male who presents for follow-up and lab review.    He reports an improvement in his condition, attributing it to the effectiveness of his new medication. He has been taking only one tablet as previously discussed, which he believes has been beneficial. He does not experience lightheadedness with this medication. He takes his medication at night, approximately 30 minutes after dinner, as he typically skips breakfast.    He expresses concern about an upcoming CT scan due to his claustrophobia. He recalls a previous experience with an MRI where he was given sedatives. He has arranged for his wife to drive him after the procedure due to the sedation.    He has an appointment scheduled for a cystoscopy and is curious about the procedure. He has not experienced any instances of waking up at night to urinate since starting Flomax.    FAMILY HISTORY  There is no history of kidney problems in the family.    Review of Systems   Constitutional:  Negative for chills, fever, malaise/fatigue and weight loss.   Respiratory:  Negative for cough and shortness of breath.    Cardiovascular:  Negative for chest pain.   Gastrointestinal:  Negative for abdominal pain, diarrhea, nausea and vomiting.   Skin:  Negative for rash.   Neurological:  Negative for dizziness.       Objective:     Exam:  /60 (BP Location: Right arm, Patient Position: Sitting, BP Cuff Size: Adult)   Pulse 67   Temp 36.1 °C (96.9 °F) (Temporal)   Resp 16   Ht 1.778 m (5' 10\")   Wt 80.5 kg (177 lb 6.4 oz)   SpO2 97%   BMI 25.45 kg/m²  Body mass index is 25.45 kg/m².    Physical Exam  Vitals reviewed.   Constitutional:       Appearance: Normal appearance.   HENT:      " Head: Normocephalic and atraumatic.      Mouth/Throat:      Mouth: Mucous membranes are moist.   Pulmonary:      Effort: Pulmonary effort is normal.   Skin:     Coloration: Skin is not jaundiced or pale.   Neurological:      General: No focal deficit present.      Mental Status: He is alert and oriented to person, place, and time.   Psychiatric:         Mood and Affect: Mood normal.         Behavior: Behavior normal.         Thought Content: Thought content normal.       Reviewed most recent/relevant labs/imaging.    Assessment & Plan:     79 y.o. male with the following -     1. Benign prostatic hyperplasia with lower urinary tract symptoms, symptom details unspecified  2. Microscopic hematuria  Chronic BPH with symptoms, bladder ultrasound had a postvoid residual of 220 mL and in the urology office was 170 mL.  Flomax has been well-tolerated since he started taking it at night rather than morning and is effective so he will continue at current dose.  Microscopic hematuria, 3-5 red blood cells without UTI findings on UA. Life long non smoker. He has a CT urogram scheduled as well as a cystoscopy.  Continue care with urology.    tamsulosin (FLOMAX) 0.4 MG capsule, Take 1 Capsule by mouth 1/2 hour after breakfast., Disp: 90 Capsule, Rfl: 3    3. Claustrophobia  PDMP reviewed, patient understands he will need a  to and from the CT.  - LORazepam (ATIVAN) 0.5 MG Tab; Take 1 Tablet by mouth one time as needed for Anxiety (30 to 90 minutes before CT) for up to 1 dose.  Dispense: 1 Tablet; Refill: 0    4. Type 2 diabetes mellitus with other specified complication, without long-term current use of insulin (HCC)  Chronic, controlled with A1c 6%.  Feeling much improved without further dizziness after reducing Synjardy from 2 tablets to 1 tablet daily so we will continue with 1 daily in addition to diabetic diet and exercise as tolerated.  Blood pressure/hyperlipidemia well-controlled.  Continue statin and lisinopril.  -  A1C in 3 months to assess after med change.  - HEMOGLOBIN A1C; Future    SitaGLIPtin-MetFORMIN HCl  MG TABLET SR 24 HR, Take 2 Tablets by mouth every day. (Patient taking differently: Take 1 Tablet by mouth every day.), Disp: 180 Tablet, Rfl: 3    5. Elevated alanine aminotransferase (ALT) level  Minimal. Trend in 3m.  - HEPATIC FUNCTION PANEL; Future    6. Hypophosphatemia  This is chronic, currently asymptomatic at 2mg/dL so no supplementation indicated.  CKD stage II, stable.  Denies diarrhea.  Previous evaluation for vitamin D deficiency, thyroid/parathyroid hormone abnormalities were without findings.  Unclear etiology.  Discussed we could consider urine studies to investigate further or have him discuss further investigation/treatment with nephrologist, patient prefers the latter.  - Referral to Nephrology    7. Leukopenia, unspecified type  Mild without neutropenia or other cell line abnormalities.  Evaluation for nutritional deficiencies etc. without findings prior.  Suspect benign ethnic variant.  Trend annually or prn.    8. Acquired hypothyroidism  Chronic, controlled. Continue medication(s) as below.    levothyroxine (SYNTHROID) 112 MCG Tab, Take 1 Tablet by mouth every day., Disp: 90 Tablet, Rfl: 3    Return in 5 months (on 12/23/2025), or if symptoms worsen or fail to improve, for Annual Medicare Visit.    Please note that this dictation was created using voice recognition software. I have made every reasonable attempt to correct obvious errors, but I expect that there are errors of grammar and possibly content that I did not discover before finalizing the note.

## 2025-07-23 ENCOUNTER — HOSPITAL ENCOUNTER (OUTPATIENT)
Dept: RADIOLOGY | Facility: MEDICAL CENTER | Age: 80
End: 2025-07-23
Payer: MEDICARE

## 2025-07-23 DIAGNOSIS — R31.29 MICROHEMATURIA: ICD-10-CM

## 2025-07-23 PROCEDURE — 74178 CT ABD&PLV WO CNTR FLWD CNTR: CPT

## 2025-07-23 PROCEDURE — 700117 HCHG RX CONTRAST REV CODE 255

## 2025-07-23 RX ADMIN — IOHEXOL 100 ML: 350 INJECTION, SOLUTION INTRAVENOUS at 10:56

## 2025-07-23 NOTE — Clinical Note
REFERRAL APPROVAL NOTICE         Sent on July 23, 2025                   Shay Ventura  [address]  [facility] [address]                   Dear Mr. Ventura,    After a careful review of the medical information and benefit coverage, [facility] has processed your referral. See below for additional details.    If applicable, you must be actively enrolled with your insurance for coverage of the authorized service. If you have any questions regarding your coverage, please contact your insurance directly.    REFERRAL INFORMATION   Referral #:  [id number]  Referred-To Department    Referred-By Provider:  Nephrology    Radha Martin D.O.   [facility]      [address]  [place] [address]  [phone] [address]  [place] [address] [phone]    Referral Start Date:  07/17/2025  Referral End Date:   07/17/2026             SCHEDULING  If you do not already have an appointment, please call [phone] to make an appointment.     MORE INFORMATION  If you do not already have a [email or web address] account, sign up at: [email or web address]  You can access your medical information, make appointments, see lab results, billing information, and more.  If you have questions regarding this referral, please contact  the [facility] Referrals department at:             [phone]. Monday - Friday 8:00AM - 5:00PM.     Sincerely,    [facility]

## 2025-07-24 ENCOUNTER — TELEPHONE (OUTPATIENT)
Dept: UROLOGY | Facility: MEDICAL CENTER | Age: 80
End: 2025-07-24
Payer: MEDICARE

## 2025-07-24 NOTE — TELEPHONE ENCOUNTER
Call to patient regarding CTU results. All questions answered. We will see patient in the clinic in September for cystoscopy